# Patient Record
Sex: FEMALE | Race: WHITE | NOT HISPANIC OR LATINO | Employment: OTHER | URBAN - METROPOLITAN AREA
[De-identification: names, ages, dates, MRNs, and addresses within clinical notes are randomized per-mention and may not be internally consistent; named-entity substitution may affect disease eponyms.]

---

## 2023-05-18 ENCOUNTER — LAB (OUTPATIENT)
Dept: LAB | Facility: LAB | Age: 78
End: 2023-05-18
Payer: MEDICARE

## 2023-05-18 ENCOUNTER — OFFICE VISIT (OUTPATIENT)
Dept: PRIMARY CARE | Facility: CLINIC | Age: 78
End: 2023-05-18
Payer: MEDICARE

## 2023-05-18 VITALS
HEIGHT: 59 IN | SYSTOLIC BLOOD PRESSURE: 130 MMHG | DIASTOLIC BLOOD PRESSURE: 84 MMHG | WEIGHT: 141 LBS | BODY MASS INDEX: 28.43 KG/M2

## 2023-05-18 DIAGNOSIS — M79.602 PAIN OF LEFT UPPER EXTREMITY: Primary | ICD-10-CM

## 2023-05-18 DIAGNOSIS — M85.88 OSTEOPENIA OF OTHER SITE: ICD-10-CM

## 2023-05-18 DIAGNOSIS — Z00.00 HEALTH CARE MAINTENANCE: ICD-10-CM

## 2023-05-18 DIAGNOSIS — D64.9 ANEMIA, UNSPECIFIED TYPE: ICD-10-CM

## 2023-05-18 DIAGNOSIS — E03.9 HYPOTHYROIDISM, UNSPECIFIED TYPE: ICD-10-CM

## 2023-05-18 DIAGNOSIS — M85.829 OSTEOPENIA OF UPPER ARM, UNSPECIFIED LATERALITY: ICD-10-CM

## 2023-05-18 DIAGNOSIS — E78.5 HYPERLIPIDEMIA, UNSPECIFIED HYPERLIPIDEMIA TYPE: ICD-10-CM

## 2023-05-18 PROBLEM — I45.2 BIFASCICULAR BLOCK: Status: ACTIVE | Noted: 2023-05-18

## 2023-05-18 PROBLEM — R94.31 ABNORMAL EKG: Status: ACTIVE | Noted: 2023-05-18

## 2023-05-18 PROBLEM — R00.0 TACHYCARDIA: Status: ACTIVE | Noted: 2023-05-18

## 2023-05-18 PROBLEM — R07.89 CHEST PAIN, ATYPICAL: Status: ACTIVE | Noted: 2023-05-18

## 2023-05-18 PROBLEM — M54.2 NECK PAIN: Status: ACTIVE | Noted: 2023-05-18

## 2023-05-18 PROBLEM — R05.3 PERSISTENT COUGH: Status: ACTIVE | Noted: 2023-05-18

## 2023-05-18 PROBLEM — I77.1 SUBCLAVIAN ARTERIAL STENOSIS (CMS-HCC): Status: RESOLVED | Noted: 2023-05-18 | Resolved: 2023-05-18

## 2023-05-18 PROBLEM — Q27.9 VENOUS ANOMALY (HHS-HCC): Status: ACTIVE | Noted: 2023-05-18

## 2023-05-18 PROBLEM — R42 DIZZINESS, NONSPECIFIC: Status: ACTIVE | Noted: 2023-05-18

## 2023-05-18 PROBLEM — I25.10 CAD (CORONARY ARTERY DISEASE): Status: ACTIVE | Noted: 2023-05-18

## 2023-05-18 PROBLEM — I77.1 SUBCLAVIAN ARTERIAL STENOSIS (CMS-HCC): Status: ACTIVE | Noted: 2023-05-18

## 2023-05-18 PROBLEM — N30.00 ACUTE CYSTITIS WITHOUT HEMATURIA: Status: ACTIVE | Noted: 2023-05-18

## 2023-05-18 PROBLEM — R59.9 SWELLING OF LYMPH NODE: Status: ACTIVE | Noted: 2023-05-18

## 2023-05-18 PROBLEM — R05.9 COUGH: Status: ACTIVE | Noted: 2023-05-18

## 2023-05-18 PROBLEM — G25.81 RESTLESS LEG SYNDROME: Status: ACTIVE | Noted: 2023-05-18

## 2023-05-18 PROBLEM — R39.9 UTI SYMPTOMS: Status: ACTIVE | Noted: 2023-05-18

## 2023-05-18 PROBLEM — M62.838 MUSCLE SPASMS OF NECK: Status: ACTIVE | Noted: 2023-05-18

## 2023-05-18 PROBLEM — R53.83 FATIGUE: Status: ACTIVE | Noted: 2023-05-18

## 2023-05-18 PROBLEM — I10 HYPERTENSION: Status: ACTIVE | Noted: 2023-05-18

## 2023-05-18 PROBLEM — E78.00 HYPERCHOLESTEREMIA: Status: ACTIVE | Noted: 2023-05-18

## 2023-05-18 PROBLEM — R00.2 PALPITATIONS: Status: ACTIVE | Noted: 2023-05-18

## 2023-05-18 PROBLEM — J01.90 ACUTE SINUSITIS: Status: ACTIVE | Noted: 2023-05-18

## 2023-05-18 PROBLEM — J30.9 ALLERGIC RHINITIS: Status: ACTIVE | Noted: 2023-05-18

## 2023-05-18 PROBLEM — K76.89 LIVER CYST: Status: ACTIVE | Noted: 2023-05-18

## 2023-05-18 PROBLEM — H93.13 TINNITUS OF BOTH EARS: Status: ACTIVE | Noted: 2023-05-18

## 2023-05-18 LAB
ALANINE AMINOTRANSFERASE (SGPT) (U/L) IN SER/PLAS: 21 U/L (ref 7–45)
ALBUMIN (G/DL) IN SER/PLAS: 4.6 G/DL (ref 3.4–5)
ALKALINE PHOSPHATASE (U/L) IN SER/PLAS: 76 U/L (ref 33–136)
ANION GAP IN SER/PLAS: 12 MMOL/L (ref 10–20)
ASPARTATE AMINOTRANSFERASE (SGOT) (U/L) IN SER/PLAS: 24 U/L (ref 9–39)
BILIRUBIN TOTAL (MG/DL) IN SER/PLAS: 0.4 MG/DL (ref 0–1.2)
CALCIUM (MG/DL) IN SER/PLAS: 10.2 MG/DL (ref 8.6–10.6)
CARBON DIOXIDE, TOTAL (MMOL/L) IN SER/PLAS: 31 MMOL/L (ref 21–32)
CHLORIDE (MMOL/L) IN SER/PLAS: 103 MMOL/L (ref 98–107)
CHOLESTEROL (MG/DL) IN SER/PLAS: 269 MG/DL (ref 0–199)
CHOLESTEROL IN HDL (MG/DL) IN SER/PLAS: 64.5 MG/DL
CHOLESTEROL/HDL RATIO: 4.2
CREATININE (MG/DL) IN SER/PLAS: 0.94 MG/DL (ref 0.5–1.05)
ERYTHROCYTE DISTRIBUTION WIDTH (RATIO) BY AUTOMATED COUNT: 13.3 % (ref 11.5–14.5)
ERYTHROCYTE MEAN CORPUSCULAR HEMOGLOBIN CONCENTRATION (G/DL) BY AUTOMATED: 31.5 G/DL (ref 32–36)
ERYTHROCYTE MEAN CORPUSCULAR VOLUME (FL) BY AUTOMATED COUNT: 100 FL (ref 80–100)
ERYTHROCYTES (10*6/UL) IN BLOOD BY AUTOMATED COUNT: 4.27 X10E12/L (ref 4–5.2)
ESTIMATED AVERAGE GLUCOSE FOR HBA1C: 120 MG/DL
GFR FEMALE: 63 ML/MIN/1.73M2
GLUCOSE (MG/DL) IN SER/PLAS: 98 MG/DL (ref 74–99)
HEMATOCRIT (%) IN BLOOD BY AUTOMATED COUNT: 42.5 % (ref 36–46)
HEMOGLOBIN (G/DL) IN BLOOD: 13.4 G/DL (ref 12–16)
HEMOGLOBIN A1C/HEMOGLOBIN TOTAL IN BLOOD: 5.8 %
LDL: 136 MG/DL (ref 0–99)
LEUKOCYTES (10*3/UL) IN BLOOD BY AUTOMATED COUNT: 7.5 X10E9/L (ref 4.4–11.3)
NON HDL CHOLESTEROL: 205 MG/DL
NRBC (PER 100 WBCS) BY AUTOMATED COUNT: 0 /100 WBC (ref 0–0)
PLATELETS (10*3/UL) IN BLOOD AUTOMATED COUNT: 273 X10E9/L (ref 150–450)
POTASSIUM (MMOL/L) IN SER/PLAS: 4.3 MMOL/L (ref 3.5–5.3)
PROTEIN TOTAL: 7 G/DL (ref 6.4–8.2)
SODIUM (MMOL/L) IN SER/PLAS: 142 MMOL/L (ref 136–145)
THYROTROPIN (MIU/L) IN SER/PLAS BY DETECTION LIMIT <= 0.05 MIU/L: 3.49 MIU/L (ref 0.44–3.98)
TRIGLYCERIDE (MG/DL) IN SER/PLAS: 344 MG/DL (ref 0–149)
UREA NITROGEN (MG/DL) IN SER/PLAS: 22 MG/DL (ref 6–23)
VLDL: 69 MG/DL (ref 0–40)

## 2023-05-18 PROCEDURE — 99204 OFFICE O/P NEW MOD 45 MIN: CPT | Performed by: STUDENT IN AN ORGANIZED HEALTH CARE EDUCATION/TRAINING PROGRAM

## 2023-05-18 PROCEDURE — 83036 HEMOGLOBIN GLYCOSYLATED A1C: CPT

## 2023-05-18 PROCEDURE — 36415 COLL VENOUS BLD VENIPUNCTURE: CPT

## 2023-05-18 PROCEDURE — 80061 LIPID PANEL: CPT

## 2023-05-18 PROCEDURE — 84443 ASSAY THYROID STIM HORMONE: CPT

## 2023-05-18 PROCEDURE — 85027 COMPLETE CBC AUTOMATED: CPT

## 2023-05-18 PROCEDURE — 1159F MED LIST DOCD IN RCRD: CPT | Performed by: STUDENT IN AN ORGANIZED HEALTH CARE EDUCATION/TRAINING PROGRAM

## 2023-05-18 PROCEDURE — 3075F SYST BP GE 130 - 139MM HG: CPT | Performed by: STUDENT IN AN ORGANIZED HEALTH CARE EDUCATION/TRAINING PROGRAM

## 2023-05-18 PROCEDURE — 1160F RVW MEDS BY RX/DR IN RCRD: CPT | Performed by: STUDENT IN AN ORGANIZED HEALTH CARE EDUCATION/TRAINING PROGRAM

## 2023-05-18 PROCEDURE — 3079F DIAST BP 80-89 MM HG: CPT | Performed by: STUDENT IN AN ORGANIZED HEALTH CARE EDUCATION/TRAINING PROGRAM

## 2023-05-18 PROCEDURE — 1036F TOBACCO NON-USER: CPT | Performed by: STUDENT IN AN ORGANIZED HEALTH CARE EDUCATION/TRAINING PROGRAM

## 2023-05-18 PROCEDURE — 80053 COMPREHEN METABOLIC PANEL: CPT

## 2023-05-18 RX ORDER — ROSUVASTATIN CALCIUM 5 MG/1
5 TABLET, COATED ORAL DAILY
COMMUNITY
Start: 2023-04-16 | End: 2023-05-18 | Stop reason: ALTCHOICE

## 2023-05-18 RX ORDER — LEVOTHYROXINE SODIUM 75 UG/1
75 TABLET ORAL DAILY
COMMUNITY
Start: 2023-04-07 | End: 2024-03-06 | Stop reason: SDUPTHER

## 2023-05-18 RX ORDER — LEVOTHYROXINE SODIUM 88 UG/1
1 TABLET ORAL DAILY
COMMUNITY
End: 2023-05-18 | Stop reason: ALTCHOICE

## 2023-05-18 RX ORDER — CALCIUM CARBONATE 300MG(750)
400 TABLET,CHEWABLE ORAL DAILY
COMMUNITY

## 2023-05-18 RX ORDER — ROSUVASTATIN CALCIUM 20 MG/1
1 TABLET, COATED ORAL DAILY
COMMUNITY
Start: 2019-05-10 | End: 2023-05-18 | Stop reason: ALTCHOICE

## 2023-05-18 RX ORDER — ATENOLOL 25 MG/1
25 TABLET ORAL DAILY
COMMUNITY
Start: 2023-01-14 | End: 2023-07-07 | Stop reason: SDUPTHER

## 2023-05-18 ASSESSMENT — ENCOUNTER SYMPTOMS
LOSS OF SENSATION IN FEET: 0
DEPRESSION: 0
ARTHRALGIAS: 1
OCCASIONAL FEELINGS OF UNSTEADINESS: 1
GASTROINTESTINAL NEGATIVE: 1
CONSTITUTIONAL NEGATIVE: 1
PSYCHIATRIC NEGATIVE: 1
CARDIOVASCULAR NEGATIVE: 1
MYALGIAS: 1
NEUROLOGICAL NEGATIVE: 1
RESPIRATORY NEGATIVE: 1

## 2023-05-18 NOTE — PROGRESS NOTES
Establish as a new patient  Pain in the left shoulder    Subjective   Patient ID: María Clifford is a 77 y.o. female.    Pt is a 77 year old female with HTN, hypothyroidism, osteopenia, HLD who presents for establishment of care. Pt overall feels well and is tolerating her medications. Up to date on most preventative care however does need a dexa scan. She does have a few concerns as she reports some issues with her L Arm. Finds that she has pain and feels as if there is a lump in the arm. Has good range of motion however gets pain every now and then. No injuries noted since th pain stated. She was able to also bring her past medical records in and these were reviewed. She is concerned about her fatty liver and did take herself off her cholesterol medication for this as she was concerned for further liver disease. Regards no additional issues at this time    PMHX as above  Social Denies any drug tobacco or alcohol use  Fhx noncontributory  Surgical denies    Arm Pain         Review of Systems   Constitutional: Negative.    HENT: Negative.     Respiratory: Negative.     Cardiovascular: Negative.    Gastrointestinal: Negative.    Musculoskeletal:  Positive for arthralgias and myalgias.        Shoulder pain   Neurological: Negative.    Psychiatric/Behavioral: Negative.         Objective Vitals Reviewed via facility EMR     Physical Exam  Constitutional:       General: She is not in acute distress.     Appearance: She is not ill-appearing.   HENT:      Mouth/Throat:      Mouth: Mucous membranes are moist.      Pharynx: Oropharynx is clear. No oropharyngeal exudate or posterior oropharyngeal erythema.   Eyes:      Pupils: Pupils are equal, round, and reactive to light.   Cardiovascular:      Rate and Rhythm: Normal rate and regular rhythm.      Heart sounds: No murmur heard.  Pulmonary:      Effort: Pulmonary effort is normal. No respiratory distress.      Breath sounds: No wheezing.   Abdominal:      General: Abdomen is  flat. Bowel sounds are normal. There is no distension.      Palpations: Abdomen is soft.      Tenderness: There is no abdominal tenderness.   Musculoskeletal:         General: Tenderness and deformity present. Normal range of motion.      Comments: Pain with internal rotation and dec rom   Skin:     General: Skin is warm and dry.   Neurological:      General: No focal deficit present.      Mental Status: She is alert and oriented to person, place, and time. Mental status is at baseline.   Psychiatric:         Mood and Affect: Mood normal.         Assessment/Plan   Diagnoses and all orders for this visit:  Pain of left upper extremity  -     XR humerus left; Future  -     XR shoulder left 2+ views; Future  Health care maintenance  -     CBC; Future  -     Comprehensive Metabolic Panel; Future  -     Hemoglobin A1C; Future  -     Lipid panel; Future  Hyperlipidemia, unspecified hyperlipidemia type  -     CT cardiac scoring wo IV contrast; Future  Hypothyroidism, unspecified type  -     TSH with reflex to Free T4 if abnormal; Future  Anemia, unspecified type  -     CBC; Future  Osteopenia of upper arm, unspecified laterality  -     XR DEXA bone density; Future  Osteopenia of other site  -     XR DEXA bone density; Future    Pt seen on establishment of care    #HTN  Stable well controlled, follows with cardiology    #HLD  High cholesterol noted, on labs, did take herself off of statis, recommend CT cardiac scoring for risk statrification and can further discuss     #fatty liver  Advise diet and exercise, has been avoiding statins secondary to this, check Cmp, lipid and CT cardiac scoring as above    #hypothyroid  Check Tsh    #arm pain, humeral pain  Noted on exam, recommend xrays, could consider PT/OT referral hjowever defers, consider ortho consult    #osteopenia  Dexa scan    #health maintaince  Previous records reviewed  Labs at this visit  UTD on age appropriate prevention  Depression screen neg  Medicare wellness  at next visit    RTC in 6-8 month

## 2023-07-07 DIAGNOSIS — I10 PRIMARY HYPERTENSION: Primary | ICD-10-CM

## 2023-07-07 RX ORDER — ATENOLOL 25 MG/1
25 TABLET ORAL DAILY
Qty: 90 TABLET | Refills: 0 | Status: SHIPPED | OUTPATIENT
Start: 2023-07-07 | End: 2023-10-03

## 2023-09-07 ENCOUNTER — TELEPHONE (OUTPATIENT)
Dept: PRIMARY CARE | Facility: CLINIC | Age: 78
End: 2023-09-07
Payer: MEDICARE

## 2023-09-07 DIAGNOSIS — E78.5 HYPERLIPIDEMIA, UNSPECIFIED HYPERLIPIDEMIA TYPE: Primary | ICD-10-CM

## 2023-09-07 RX ORDER — ATORVASTATIN CALCIUM 20 MG/1
20 TABLET, FILM COATED ORAL DAILY
Qty: 90 TABLET | Refills: 0 | Status: SHIPPED | OUTPATIENT
Start: 2023-09-07 | End: 2024-01-17 | Stop reason: WASHOUT

## 2023-09-07 NOTE — TELEPHONE ENCOUNTER
Informed pt cardiac scoring elevated and lipitor rx'd.  She said she has fatty liver and in the past it was advised not to take cholesterol lowering meds.  Please call.

## 2023-09-12 RX ORDER — EZETIMIBE 10 MG/1
10 TABLET ORAL DAILY
Qty: 90 TABLET | Refills: 1 | Status: SHIPPED | OUTPATIENT
Start: 2023-09-12 | End: 2024-03-10

## 2024-01-17 ENCOUNTER — OFFICE VISIT (OUTPATIENT)
Dept: URGENT CARE | Facility: CLINIC | Age: 79
End: 2024-01-17
Payer: MEDICARE

## 2024-01-17 VITALS
TEMPERATURE: 96.1 F | RESPIRATION RATE: 14 BRPM | HEIGHT: 59 IN | HEART RATE: 110 BPM | BODY MASS INDEX: 27.21 KG/M2 | WEIGHT: 135 LBS | OXYGEN SATURATION: 94 % | SYSTOLIC BLOOD PRESSURE: 128 MMHG | DIASTOLIC BLOOD PRESSURE: 82 MMHG

## 2024-01-17 DIAGNOSIS — J01.90 ACUTE SINUSITIS, RECURRENCE NOT SPECIFIED, UNSPECIFIED LOCATION: Primary | ICD-10-CM

## 2024-01-17 PROCEDURE — 1159F MED LIST DOCD IN RCRD: CPT | Performed by: PHYSICIAN ASSISTANT

## 2024-01-17 PROCEDURE — 99203 OFFICE O/P NEW LOW 30 MIN: CPT | Performed by: PHYSICIAN ASSISTANT

## 2024-01-17 PROCEDURE — 1160F RVW MEDS BY RX/DR IN RCRD: CPT | Performed by: PHYSICIAN ASSISTANT

## 2024-01-17 PROCEDURE — 1125F AMNT PAIN NOTED PAIN PRSNT: CPT | Performed by: PHYSICIAN ASSISTANT

## 2024-01-17 PROCEDURE — 1036F TOBACCO NON-USER: CPT | Performed by: PHYSICIAN ASSISTANT

## 2024-01-17 PROCEDURE — 3074F SYST BP LT 130 MM HG: CPT | Performed by: PHYSICIAN ASSISTANT

## 2024-01-17 PROCEDURE — 3079F DIAST BP 80-89 MM HG: CPT | Performed by: PHYSICIAN ASSISTANT

## 2024-01-17 RX ORDER — DOXYCYCLINE 100 MG/1
100 CAPSULE ORAL 2 TIMES DAILY
Qty: 20 CAPSULE | Refills: 0 | Status: SHIPPED | OUTPATIENT
Start: 2024-01-17 | End: 2024-01-27

## 2024-01-17 RX ORDER — PSEUDOEPHEDRINE HCL 120 MG
1 TABLET, EXTENDED RELEASE ORAL DAILY
COMMUNITY

## 2024-01-17 RX ORDER — BENZONATATE 100 MG/1
100 CAPSULE ORAL 3 TIMES DAILY PRN
Qty: 30 CAPSULE | Refills: 0 | Status: SHIPPED | OUTPATIENT
Start: 2024-01-17 | End: 2025-01-16

## 2024-01-17 ASSESSMENT — ENCOUNTER SYMPTOMS
SINUS COMPLAINT: 1
COUGH: 1
FATIGUE: 1
SINUS PAIN: 1
SINUS PRESSURE: 1

## 2024-01-17 ASSESSMENT — PAIN SCALES - GENERAL: PAINLEVEL: 6

## 2024-01-17 NOTE — PROGRESS NOTES
Subjective   Patient ID: María Clifford is a 78 y.o. female.    Patient is a 78-year-old female who complains of ongoing congestion, sinus pressure, facial pain and cough that she has been experiencing for approximately the past 4 weeks.  Patient denies fever, chills or myalgia.  Patient has no history of asthma or COPD and does not smoke.      Cough    Sinus Problem  Associated symptoms: congestion, cough and fatigue    Fatigue  Associated symptoms: congestion, cough and fatigue    The following portions of the chart were reviewed this encounter and updated as appropriate:   Review of Systems   Constitutional:  Positive for fatigue.   HENT:  Positive for congestion, sinus pressure and sinus pain.    Respiratory:  Positive for cough.    All other systems reviewed and are negative.  Objective   Physical Exam  Vitals and nursing note reviewed.   Constitutional:       Appearance: Normal appearance. She is normal weight.   HENT:      Head: Normocephalic and atraumatic.      Right Ear: Tympanic membrane, ear canal and external ear normal.      Left Ear: Tympanic membrane, ear canal and external ear normal.      Nose: Nose normal. No congestion or rhinorrhea.      Mouth/Throat:      Mouth: Mucous membranes are moist.      Pharynx: Oropharynx is clear. No oropharyngeal exudate or posterior oropharyngeal erythema.   Eyes:      Extraocular Movements: Extraocular movements intact.      Conjunctiva/sclera: Conjunctivae normal.      Pupils: Pupils are equal, round, and reactive to light.   Cardiovascular:      Rate and Rhythm: Normal rate and regular rhythm.      Pulses: Normal pulses.      Heart sounds: Normal heart sounds.   Pulmonary:      Effort: Pulmonary effort is normal. No respiratory distress.      Breath sounds: Normal breath sounds. No stridor. No wheezing, rhonchi or rales.   Musculoskeletal:      Cervical back: Normal range of motion and neck supple.   Skin:     General: Skin is warm and dry.      Capillary Refill:  Capillary refill takes less than 2 seconds.   Neurological:      General: No focal deficit present.      Mental Status: She is alert and oriented to person, place, and time.   Psychiatric:         Mood and Affect: Mood normal.         Behavior: Behavior normal.         Thought Content: Thought content normal.         Judgment: Judgment normal.     Assessment/Plan   Physical exam findings as noted above.  Patient was provided with prescriptions for doxycycline 100 mg and Tessalon 100 mg.  Supportive care instructions were discussed and the patient verbalizes clear understanding of same.    CLINICAL IMPRESSION:  Acute Sinusitis    Diagnoses and all orders for this visit:  Acute sinusitis, recurrence not specified, unspecified location  -     doxycycline (Monodox) 100 mg capsule; Take 1 capsule (100 mg) by mouth 2 times a day for 10 days. Take with at least 8 ounces (large glass) of water, do not lie down for 30 minutes after  -     benzonatate (Tessalon Perles) 100 mg capsule; Take 1 capsule (100 mg) by mouth 3 times a day as needed for cough.

## 2024-03-06 DIAGNOSIS — E03.9 HYPOTHYROIDISM, UNSPECIFIED TYPE: Primary | ICD-10-CM

## 2024-03-06 RX ORDER — LEVOTHYROXINE SODIUM 75 UG/1
75 TABLET ORAL DAILY
Qty: 30 TABLET | Refills: 0 | Status: SHIPPED | OUTPATIENT
Start: 2024-03-06 | End: 2024-03-22 | Stop reason: SDUPTHER

## 2024-03-21 ENCOUNTER — OFFICE VISIT (OUTPATIENT)
Dept: OBSTETRICS AND GYNECOLOGY | Facility: CLINIC | Age: 79
End: 2024-03-21
Payer: MEDICARE

## 2024-03-21 DIAGNOSIS — Z12.31 ENCOUNTER FOR SCREENING MAMMOGRAM FOR MALIGNANT NEOPLASM OF BREAST: ICD-10-CM

## 2024-03-21 DIAGNOSIS — Z12.83 SKIN EXAM FOR MALIGNANT NEOPLASM: Primary | ICD-10-CM

## 2024-03-21 DIAGNOSIS — I10 PRIMARY HYPERTENSION: ICD-10-CM

## 2024-03-21 DIAGNOSIS — L29.3 GENITAL PRURITUS: ICD-10-CM

## 2024-03-21 PROCEDURE — 1157F ADVNC CARE PLAN IN RCRD: CPT | Performed by: ADVANCED PRACTICE MIDWIFE

## 2024-03-21 PROCEDURE — 99203 OFFICE O/P NEW LOW 30 MIN: CPT | Performed by: ADVANCED PRACTICE MIDWIFE

## 2024-03-21 PROCEDURE — 1036F TOBACCO NON-USER: CPT | Performed by: ADVANCED PRACTICE MIDWIFE

## 2024-03-21 PROCEDURE — 1159F MED LIST DOCD IN RCRD: CPT | Performed by: ADVANCED PRACTICE MIDWIFE

## 2024-03-21 RX ORDER — ALUMINUM ZIRCONIUM OCTACHLOROHYDREX GLY 16 G/100G
GEL TOPICAL
COMMUNITY
Start: 2022-12-14

## 2024-03-21 RX ORDER — NYSTATIN 100000 [USP'U]/G
1 POWDER TOPICAL 3 TIMES DAILY
Qty: 15 G | Refills: 0 | Status: SHIPPED | OUTPATIENT
Start: 2024-03-21 | End: 2025-03-21

## 2024-03-21 ASSESSMENT — ENCOUNTER SYMPTOMS
HEMATOLOGIC/LYMPHATIC NEGATIVE: 1
EYES NEGATIVE: 1
NEUROLOGICAL NEGATIVE: 1
PSYCHIATRIC NEGATIVE: 1
CONSTITUTIONAL NEGATIVE: 1
ENDOCRINE NEGATIVE: 1
MUSCULOSKELETAL NEGATIVE: 1
GASTROINTESTINAL NEGATIVE: 1
CARDIOVASCULAR NEGATIVE: 1
ALLERGIC/IMMUNOLOGIC NEGATIVE: 1
RESPIRATORY NEGATIVE: 1

## 2024-03-22 ENCOUNTER — OFFICE VISIT (OUTPATIENT)
Dept: PRIMARY CARE | Facility: CLINIC | Age: 79
End: 2024-03-22
Payer: MEDICARE

## 2024-03-22 ENCOUNTER — HOSPITAL ENCOUNTER (OUTPATIENT)
Dept: RADIOLOGY | Facility: CLINIC | Age: 79
Discharge: HOME | End: 2024-03-22
Payer: MEDICARE

## 2024-03-22 VITALS
SYSTOLIC BLOOD PRESSURE: 122 MMHG | WEIGHT: 143 LBS | BODY MASS INDEX: 28.83 KG/M2 | DIASTOLIC BLOOD PRESSURE: 70 MMHG | HEIGHT: 59 IN

## 2024-03-22 DIAGNOSIS — M25.512 CHRONIC LEFT SHOULDER PAIN: ICD-10-CM

## 2024-03-22 DIAGNOSIS — G89.29 CHRONIC LEFT SHOULDER PAIN: ICD-10-CM

## 2024-03-22 DIAGNOSIS — E03.9 HYPOTHYROIDISM, UNSPECIFIED TYPE: ICD-10-CM

## 2024-03-22 DIAGNOSIS — E78.5 HYPERLIPIDEMIA, UNSPECIFIED HYPERLIPIDEMIA TYPE: ICD-10-CM

## 2024-03-22 DIAGNOSIS — I25.10 CORONARY ARTERY DISEASE DUE TO LIPID RICH PLAQUE: Primary | ICD-10-CM

## 2024-03-22 DIAGNOSIS — I25.83 CORONARY ARTERY DISEASE DUE TO LIPID RICH PLAQUE: Primary | ICD-10-CM

## 2024-03-22 PROCEDURE — 1036F TOBACCO NON-USER: CPT | Performed by: STUDENT IN AN ORGANIZED HEALTH CARE EDUCATION/TRAINING PROGRAM

## 2024-03-22 PROCEDURE — 73030 X-RAY EXAM OF SHOULDER: CPT | Mod: LEFT SIDE | Performed by: RADIOLOGY

## 2024-03-22 PROCEDURE — 3074F SYST BP LT 130 MM HG: CPT | Performed by: STUDENT IN AN ORGANIZED HEALTH CARE EDUCATION/TRAINING PROGRAM

## 2024-03-22 PROCEDURE — 3078F DIAST BP <80 MM HG: CPT | Performed by: STUDENT IN AN ORGANIZED HEALTH CARE EDUCATION/TRAINING PROGRAM

## 2024-03-22 PROCEDURE — 73030 X-RAY EXAM OF SHOULDER: CPT | Mod: LT

## 2024-03-22 PROCEDURE — 1159F MED LIST DOCD IN RCRD: CPT | Performed by: STUDENT IN AN ORGANIZED HEALTH CARE EDUCATION/TRAINING PROGRAM

## 2024-03-22 PROCEDURE — 1160F RVW MEDS BY RX/DR IN RCRD: CPT | Performed by: STUDENT IN AN ORGANIZED HEALTH CARE EDUCATION/TRAINING PROGRAM

## 2024-03-22 PROCEDURE — 1157F ADVNC CARE PLAN IN RCRD: CPT | Performed by: STUDENT IN AN ORGANIZED HEALTH CARE EDUCATION/TRAINING PROGRAM

## 2024-03-22 PROCEDURE — 99214 OFFICE O/P EST MOD 30 MIN: CPT | Performed by: STUDENT IN AN ORGANIZED HEALTH CARE EDUCATION/TRAINING PROGRAM

## 2024-03-22 PROCEDURE — G2211 COMPLEX E/M VISIT ADD ON: HCPCS | Performed by: STUDENT IN AN ORGANIZED HEALTH CARE EDUCATION/TRAINING PROGRAM

## 2024-03-22 RX ORDER — LEVOTHYROXINE SODIUM 75 UG/1
75 TABLET ORAL DAILY
Qty: 90 TABLET | Refills: 0 | Status: SHIPPED | OUTPATIENT
Start: 2024-03-22

## 2024-03-22 RX ORDER — NAPROXEN SODIUM 220 MG/1
81 TABLET, FILM COATED ORAL DAILY
Qty: 90 TABLET | Refills: 1 | Status: SHIPPED | OUTPATIENT
Start: 2024-03-22 | End: 2024-09-18

## 2024-03-22 ASSESSMENT — ENCOUNTER SYMPTOMS
PSYCHIATRIC NEGATIVE: 1
RESPIRATORY NEGATIVE: 1
CARDIOVASCULAR NEGATIVE: 1
JOINT SWELLING: 1
GASTROINTESTINAL NEGATIVE: 1
MYALGIAS: 1
ARTHRALGIAS: 1
NEUROLOGICAL NEGATIVE: 1
CONSTITUTIONAL NEGATIVE: 1

## 2024-03-22 NOTE — PROGRESS NOTES
"Subjective   Patient ID: María Clifford is a 78 y.o. female who presents for Follow-up (Med refill).    Patient seen on follow-up.  She reports that she does have a couple of issues to discuss.  Is concerned about her underlying cardiovascular risk score.  She did get some screening test that showed that she is a significant elevation.  She is unable to tolerate a statin, did have an elevated cardiac calcium score.  Is wondering about management with regards to this.  In addition, she reports that she is having some issues with her left shoulder.  Does have decreased range of motion especially with overhead activities.  It is somewhat painful at times, however mostly tolerable.  Is concerned about her eyedrops from her doctor.  Is wondering about alternative treatments as well.  Otherwise, states no additional issues or concerns.,         Review of Systems   Constitutional: Negative.    HENT: Negative.     Respiratory: Negative.     Cardiovascular: Negative.    Gastrointestinal: Negative.    Musculoskeletal:  Positive for arthralgias, joint swelling and myalgias.   Neurological: Negative.    Psychiatric/Behavioral: Negative.         Objective   Ht 1.499 m (4' 11\")   Wt 64.9 kg (143 lb)   BMI 28.88 kg/m²     Physical Exam  Constitutional:       General: She is not in acute distress.     Appearance: She is not ill-appearing.   Eyes:      Pupils: Pupils are equal, round, and reactive to light.   Cardiovascular:      Rate and Rhythm: Normal rate and regular rhythm.      Pulses: Normal pulses.      Heart sounds: No murmur heard.  Pulmonary:      Effort: No respiratory distress.      Breath sounds: No wheezing.   Abdominal:      General: Abdomen is flat. Bowel sounds are normal. There is no distension.   Musculoskeletal:      Right lower leg: No edema.      Left lower leg: No edema.      Comments: Decreased range of motion of left shoulder.  Especially with internal rotation   Skin:     General: Skin is warm and dry. "   Neurological:      Mental Status: She is alert. Mental status is at baseline.      Cranial Nerves: No cranial nerve deficit.      Motor: No weakness.   Psychiatric:         Mood and Affect: Mood normal.         Behavior: Behavior normal.         Assessment/Plan   Problem List Items Addressed This Visit             ICD-10-CM    CAD (coronary artery disease) - Primary I25.10    Relevant Medications    aspirin 81 mg chewable tablet    Other Relevant Orders    Nuclear Stress Test    Hypothyroidism E03.9    Relevant Medications    Synthroid 75 mcg tablet    Other Relevant Orders    Nuclear Stress Test     Other Visit Diagnoses         Codes    Hyperlipidemia, unspecified hyperlipidemia type     E78.5    Chronic left shoulder pain     M25.512, G89.29    Relevant Orders    Referral to Orthopaedic Surgery    XR shoulder left 2+ views                Pt seen on establishment of care     #HTN  Stable well controlled, follows with cardiology    #HLD  She is unable to tolerate statins is on Zetia however cholesterol levels still are elevated, potentially could be a candidate of Pcks9 inhibitor.  She will discuss this with her cardiologist.  Does have elevated risk factors, recommend stress test     #fatty liver  Continue diet and exercise, as avoidance of statins secondary to this advised to discuss with cardiology    #hypothyroid  Check Tsh     #arm pain, humeral pain  Significantly worsening, recommend x-ray today orthopedic follow-up     #osteopenia  Dexa scan vitamin D supplements needs DEXA scan next year 2025    #Glaucoma  Would prefer alternative eyedrops, advised to discuss with ophthalmology could consider tacrolimus     #health maintaince  Previous records reviewed  Labs at this visit  UTD on age appropriate prevention  Depression screen neg  Medicare wellness at next visit     RTC in 6-8 month

## 2024-03-29 ENCOUNTER — OFFICE VISIT (OUTPATIENT)
Dept: ORTHOPEDIC SURGERY | Facility: CLINIC | Age: 79
End: 2024-03-29
Payer: MEDICARE

## 2024-03-29 VITALS — HEIGHT: 59 IN | WEIGHT: 144 LBS | BODY MASS INDEX: 29.03 KG/M2

## 2024-03-29 DIAGNOSIS — M25.512 CHRONIC LEFT SHOULDER PAIN: Primary | ICD-10-CM

## 2024-03-29 DIAGNOSIS — G89.29 CHRONIC LEFT SHOULDER PAIN: Primary | ICD-10-CM

## 2024-03-29 DIAGNOSIS — M75.82 TENDINITIS OF LEFT ROTATOR CUFF: ICD-10-CM

## 2024-03-29 PROCEDURE — 1157F ADVNC CARE PLAN IN RCRD: CPT | Performed by: ORTHOPAEDIC SURGERY

## 2024-03-29 PROCEDURE — 99204 OFFICE O/P NEW MOD 45 MIN: CPT | Performed by: ORTHOPAEDIC SURGERY

## 2024-03-29 PROCEDURE — 1160F RVW MEDS BY RX/DR IN RCRD: CPT | Performed by: ORTHOPAEDIC SURGERY

## 2024-03-29 PROCEDURE — 1036F TOBACCO NON-USER: CPT | Performed by: ORTHOPAEDIC SURGERY

## 2024-03-29 PROCEDURE — 20610 DRAIN/INJ JOINT/BURSA W/O US: CPT | Performed by: ORTHOPAEDIC SURGERY

## 2024-03-29 PROCEDURE — 1159F MED LIST DOCD IN RCRD: CPT | Performed by: ORTHOPAEDIC SURGERY

## 2024-03-29 RX ORDER — METHYLPREDNISOLONE ACETATE 40 MG/ML
40 INJECTION, SUSPENSION INTRA-ARTICULAR; INTRALESIONAL; INTRAMUSCULAR; SOFT TISSUE
Status: COMPLETED | OUTPATIENT
Start: 2024-03-29 | End: 2024-03-29

## 2024-03-29 RX ORDER — LIDOCAINE HYDROCHLORIDE 20 MG/ML
2 INJECTION, SOLUTION INFILTRATION; PERINEURAL
Status: COMPLETED | OUTPATIENT
Start: 2024-03-29 | End: 2024-03-29

## 2024-03-29 RX ADMIN — METHYLPREDNISOLONE ACETATE 40 MG: 40 INJECTION, SUSPENSION INTRA-ARTICULAR; INTRALESIONAL; INTRAMUSCULAR; SOFT TISSUE at 09:53

## 2024-03-29 RX ADMIN — LIDOCAINE HYDROCHLORIDE 2 ML: 20 INJECTION, SOLUTION INFILTRATION; PERINEURAL at 09:53

## 2024-03-29 NOTE — PROGRESS NOTES
Chief complaint is left shoulder pain.  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  She has no numbness or tingling it hurts with shoulder motion she is right-hand dominant  No prior shoulder problems  Past medical,family and social histories have been reviewed and are up to date.  All other body systems have been reviewed and are negative for complaint.  Constitutional: Well-developed well-nourished   Eyes: Sclerae anicteric, pupils equal and round  HENT: Normocephalic atraumatic  Cardiovascular: Pulses full, regular rate and rhythm  Respiratory: Breathing not labored, no wheezing  Integumentary: Skin intact, no lesions or rashes  Neurological: Sensation intact, no gross strength deficits, reflexes equal  Psychiatric: Alert oriented and appropriate  Hematologic/lymphatic: No lymphadenopathy  Left shoulder: Mild shoulder atrophy no deformity lacks about 40 degrees of elevation and 3 levels of internal rotation and external rotation is equivalent  He has positive impingement pain she is tender along the tuberosity  X-rays are personally reviewed and are negative  Impression rotator cuff syndrome  Discussed natural history  Injected shoulder today  Physical therapy referral  Consider further imaging if symptoms persist  All questions answered  L Inj/Asp: L subacromial bursa on 3/29/2024 9:53 AM  Indications: pain  Details: 21 G needle, lateral approach  Medications: 40 mg methylPREDNISolone acetate 40 mg/mL; 2 mL lidocaine 20 mg/mL (2 %)

## 2024-03-30 ENCOUNTER — OFFICE VISIT (OUTPATIENT)
Dept: URGENT CARE | Facility: CLINIC | Age: 79
End: 2024-03-30
Payer: MEDICARE

## 2024-03-30 VITALS
HEIGHT: 59 IN | TEMPERATURE: 97.7 F | HEART RATE: 71 BPM | WEIGHT: 135 LBS | OXYGEN SATURATION: 92 % | SYSTOLIC BLOOD PRESSURE: 130 MMHG | BODY MASS INDEX: 27.21 KG/M2 | DIASTOLIC BLOOD PRESSURE: 68 MMHG | RESPIRATION RATE: 14 BRPM

## 2024-03-30 DIAGNOSIS — H10.32 ACUTE CONJUNCTIVITIS OF LEFT EYE, UNSPECIFIED ACUTE CONJUNCTIVITIS TYPE: Primary | ICD-10-CM

## 2024-03-30 PROCEDURE — 1157F ADVNC CARE PLAN IN RCRD: CPT | Performed by: PHYSICIAN ASSISTANT

## 2024-03-30 PROCEDURE — 1126F AMNT PAIN NOTED NONE PRSNT: CPT | Performed by: PHYSICIAN ASSISTANT

## 2024-03-30 PROCEDURE — 1036F TOBACCO NON-USER: CPT | Performed by: PHYSICIAN ASSISTANT

## 2024-03-30 PROCEDURE — 99213 OFFICE O/P EST LOW 20 MIN: CPT | Performed by: PHYSICIAN ASSISTANT

## 2024-03-30 PROCEDURE — 3075F SYST BP GE 130 - 139MM HG: CPT | Performed by: PHYSICIAN ASSISTANT

## 2024-03-30 PROCEDURE — 1159F MED LIST DOCD IN RCRD: CPT | Performed by: PHYSICIAN ASSISTANT

## 2024-03-30 PROCEDURE — 1160F RVW MEDS BY RX/DR IN RCRD: CPT | Performed by: PHYSICIAN ASSISTANT

## 2024-03-30 PROCEDURE — 3078F DIAST BP <80 MM HG: CPT | Performed by: PHYSICIAN ASSISTANT

## 2024-03-30 RX ORDER — AZELASTINE HYDROCHLORIDE 0.5 MG/ML
1 SOLUTION/ DROPS OPHTHALMIC 2 TIMES DAILY
Qty: 6 ML | Refills: 0 | Status: SHIPPED | OUTPATIENT
Start: 2024-03-30 | End: 2025-03-30

## 2024-03-30 RX ORDER — AZELASTINE HYDROCHLORIDE 0.5 MG/ML
1 SOLUTION/ DROPS OPHTHALMIC 2 TIMES DAILY
Qty: 6 ML | Refills: 0 | Status: SHIPPED | OUTPATIENT
Start: 2024-03-30 | End: 2024-03-30 | Stop reason: SDUPTHER

## 2024-03-30 ASSESSMENT — PAIN SCALES - GENERAL: PAINLEVEL: 0-NO PAIN

## 2024-03-30 NOTE — PROGRESS NOTES
"Subjective   Patient ID: María Clifford is a 78 y.o. female who presents for Eye Problem (Left eye redness and bilateral eye watery discharge x5 hours. No pain. +Itching).  Patient denies any visual changes.  She is currently supposed to be taking Restasis for dry eye syndrome.  She states that she has discontinued this over the last several days as she was looking at the disposal instructions which advise she drop it off at a pharmacy if she is going to dispose of it rather than throwing in the trash or down the toilet.  Highly concerned that if this medication requires such strict rules regarding disposal that she does not want to put this in her eyes.    Past Medical History:   Diagnosis Date    Disease of intestine, unspecified     Intestinal disorder    Disease of upper respiratory tract, unspecified     Upper respiratory disease    Personal history of other diseases of the circulatory system     History of cardiac murmur    Personal history of other diseases of the circulatory system     History of heart block    Personal history of other diseases of the digestive system     History of hepatic disease    Personal history of other diseases of the musculoskeletal system and connective tissue     History of arthritis    Personal history of other endocrine, nutritional and metabolic disease     History of hyperlipidemia    Personal history of other endocrine, nutritional and metabolic disease     History of hyperthyroidism    Personal history of other specified conditions     History of headache    Personal history of pneumonia (recurrent)     History of pneumonia    Personal history of transient ischemic attack (TIA), and cerebral infarction without residual deficits     History of transient cerebral ischemia         The remainder of the systems were reviewed and are negative unless noted above      Objective   /68   Pulse 71   Temp 36.5 °C (97.7 °F) (Temporal)   Resp 14   Ht 1.499 m (4' 11\")   Wt 61.2 " kg (135 lb)   SpO2 92%   BMI 27.27 kg/m²   Physical Exam  Constitutional:       General: She is not in acute distress.     Appearance: Normal appearance. She is not ill-appearing, toxic-appearing or diaphoretic.   HENT:      Head: Normocephalic and atraumatic.      Mouth/Throat:      Mouth: Mucous membranes are moist.      Pharynx: Oropharynx is clear.   Eyes:      General: No scleral icterus.     Extraocular Movements: Extraocular movements intact.      Pupils: Pupils are equal, round, and reactive to light.      Comments: Mild conjunctival injection of the left medial line without any purulent discharge.  Some clear watery discharge present   Cardiovascular:      Rate and Rhythm: Normal rate and regular rhythm.      Heart sounds: No murmur heard.  Pulmonary:      Effort: Pulmonary effort is normal. No respiratory distress.      Breath sounds: Normal breath sounds. No wheezing.   Musculoskeletal:         General: No swelling, tenderness, deformity or signs of injury. Normal range of motion.      Cervical back: Normal range of motion and neck supple.   Skin:     General: Skin is warm and dry.      Findings: No erythema or rash.   Neurological:      General: No focal deficit present.      Mental Status: She is alert and oriented to person, place, and time.      Gait: Gait normal.         Assessment/Plan   Problem List Items Addressed This Visit       Acute conjunctivitis of left eye - Primary   I am recommending follow-up with ophthalmology given patient has discontinued the Restasis drops.  I am sending azelastine drops to the pharmacy to help with the inflammation of the conjunctiva but at this time no findings that raise suspicion for bacterial conjunctivitis.  Perhaps her dry eye syndrome is playing a role in her symptoms especially as it is currently untreated

## 2024-03-30 NOTE — PATIENT INSTRUCTIONS
Assessment/Plan   Problem List Items Addressed This Visit       Acute conjunctivitis of left eye - Primary   I am recommending follow-up with ophthalmology given patient has discontinued the Restasis drops.  I am sending azelastine drops to the pharmacy to help with the inflammation of the conjunctiva but at this time no findings that raise suspicion for bacterial conjunctivitis.  Perhaps her dry eye syndrome is playing a role in her symptoms especially as it is currently untreated

## 2024-04-03 ENCOUNTER — APPOINTMENT (OUTPATIENT)
Dept: RADIOLOGY | Facility: CLINIC | Age: 79
End: 2024-04-03
Payer: MEDICARE

## 2024-04-03 ENCOUNTER — APPOINTMENT (OUTPATIENT)
Dept: CARDIOLOGY | Facility: CLINIC | Age: 79
End: 2024-04-03
Payer: MEDICARE

## 2024-04-10 ENCOUNTER — APPOINTMENT (OUTPATIENT)
Dept: RADIOLOGY | Facility: CLINIC | Age: 79
End: 2024-04-10
Payer: MEDICARE

## 2024-04-10 ENCOUNTER — EVALUATION (OUTPATIENT)
Dept: PHYSICAL THERAPY | Facility: CLINIC | Age: 79
End: 2024-04-10
Payer: MEDICARE

## 2024-04-10 ENCOUNTER — APPOINTMENT (OUTPATIENT)
Dept: CARDIOLOGY | Facility: CLINIC | Age: 79
End: 2024-04-10
Payer: MEDICARE

## 2024-04-10 DIAGNOSIS — M25.512 CHRONIC LEFT SHOULDER PAIN: ICD-10-CM

## 2024-04-10 DIAGNOSIS — M25.512 CHRONIC PAIN IN LEFT SHOULDER: Primary | ICD-10-CM

## 2024-04-10 DIAGNOSIS — G89.29 CHRONIC PAIN IN LEFT SHOULDER: Primary | ICD-10-CM

## 2024-04-10 DIAGNOSIS — G89.29 CHRONIC LEFT SHOULDER PAIN: ICD-10-CM

## 2024-04-10 PROCEDURE — 97110 THERAPEUTIC EXERCISES: CPT | Mod: GP

## 2024-04-10 PROCEDURE — 97161 PT EVAL LOW COMPLEX 20 MIN: CPT | Mod: GP

## 2024-04-10 ASSESSMENT — COLUMBIA-SUICIDE SEVERITY RATING SCALE - C-SSRS
6. HAVE YOU EVER DONE ANYTHING, STARTED TO DO ANYTHING, OR PREPARED TO DO ANYTHING TO END YOUR LIFE?: NO
1. IN THE PAST MONTH, HAVE YOU WISHED YOU WERE DEAD OR WISHED YOU COULD GO TO SLEEP AND NOT WAKE UP?: NO
2. HAVE YOU ACTUALLY HAD ANY THOUGHTS OF KILLING YOURSELF?: NO

## 2024-04-10 ASSESSMENT — PATIENT HEALTH QUESTIONNAIRE - PHQ9
SUM OF ALL RESPONSES TO PHQ9 QUESTIONS 1 AND 2: 0
1. LITTLE INTEREST OR PLEASURE IN DOING THINGS: NOT AT ALL
2. FEELING DOWN, DEPRESSED OR HOPELESS: NOT AT ALL

## 2024-04-10 NOTE — PROGRESS NOTES
Physical Therapy Evaluation    Patient Name María Clifford  MRN: [id number]  Today's Date: 04/10/24  Time Calculation  Start Time: 1335  Stop Time: 1415  Time Calculation (min): 40 min    Insurance: Payor: [employer] MEDICARE / Plan: [employer] GOLD CHOICE / Product Type: *No Product type* / EVAL $11.57 // VISITS ARE MED NEC NEEDS AUTH [place] PAYS % OOP 1450.00 [phone] APPLIED   -authorization required: yes  -number of visits authorized:  -Authorization/certification dates:  Next MD appointment: scheduled but not sure when-not in computer    Problem List Items Addressed This Visit             ICD-10-CM    Chronic pain in left shoulder - Primary M25.512, G89.29    Relevant Orders    Follow Up In Physical Therapy     Other Visit Diagnoses         Codes    Chronic left shoulder pain     M25.512, G89.29    Relevant Orders    Follow Up In Physical Therapy            Onset Date: 12/1/23  Referring Provider: Silvestre Macias MD   PT Orders: eval and treat    Assessment:    Impression/Clinical Presentation:L shoulder  pain with mild degeneration on xray presenting with possible RCT/impingement limiting function with dsg/lifting/reaching  Also with underlying cervical spine problems/postural deficits contributing to pain    Skilled PT services will aid in advancing functional status and attaining therapy goals.    Problem List:  -activity limitations  -Functional limitations  -Pain L shoulder  -decreased  ROM  -decreased strength   -posture awareness  -decreased knowledge of HEP      Goals:  STG 2 wks  Compliant with HEP  Dec pain 25%    LTG by discharge  I HEP  Improve functional outcome score to indicate improved functional mobility  Dec pain % on pain scale   Inc AROM L shoulder WNL with improved dsg  Inc LUE strength 1 grade with improved lifting ability to assist  at work  Inc posture awareness  Inc cervical ROM WFL    Rehab Potential:  good    Clinical  Presentation:    stable/and/or uncomplicated characteristics                                              Level of Complexity: low  Plan:    Therapeutic exercise, Manual therapy, Home program instruction and progression, Neuromuscular re-education, Therapeutic activities, Self care and home management, Instruction in activity modification, Electrical stimulation, Vasopneumatic device + cold, and Cryotherapy    UBE for soft tissue warmup, posture instruction/exercises, ROM/ strengthening shoulder, scapular strengthening/stabilization, manual and modalities prn, upper quadrant stretches    2 x week  until goals met or maximum rehab potential met  Pt is currently scheduled for 4 weeks    Plan of care was designed with input and agreement by the patient    Subjective:    Current Episode of Functional Impairment and/or Pain :  Chief complaint/HPI: insidious onset of L shoulder pain  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, transportation for older individuals, helps  with construction    Objective:    Ortho:  AROM (PROM)  RUE WNL  LUE  shoulder flexion: 103 (140 pain)  shoulder abduction: 93 (140  pain)  shoulder extension: 50  shoulder IR: 90  shoulder ER: 55  elbow extension: WNL  elbow flexion: WNL    Cervical %  BB 25% pain down into scapular region  RSB 40%  LSB 25%  RR 90%  LR 25%      Strength   shoulder flexion: 3+                 abduction: 3+  deltoid substitution                 IR: 3+                 ER: 3+  biceps: 4-  triceps: 4-    Special Tests  impingement sign: + difficulty with position  apleys ER: -  apleys IR: +  speeds:   empty can: -  drop arm: -  push test: unable to obtain test position  Quadrant -    Posture: RS/FH, slouched posture sitting    Palpation:no POP    Outcome Measures:  Other Measures  Disability of Arm Shoulder Hand (DASH): 27     Treatment:    PT Evaluation Time Entry  PT Evaluation (Low) Time Entry: 25  minutes    Therapeutic Exercise:                             15 minutes  Supine wand flexion 10x  Supine wand ER 10x  Seated abduction wand 10x  Scapular retraction 10x             Response to treatment: improved knowledge and understanding of condition    Education: Educated on relevant anatomy and expected plan of care  Instructed in initial HEP including reasoning of given exercises and issued written instructions  V/c's to complete exercises

## 2024-04-16 ENCOUNTER — APPOINTMENT (OUTPATIENT)
Dept: RADIOLOGY | Facility: CLINIC | Age: 79
End: 2024-04-16
Payer: MEDICARE

## 2024-04-16 ENCOUNTER — APPOINTMENT (OUTPATIENT)
Dept: CARDIOLOGY | Facility: CLINIC | Age: 79
End: 2024-04-16
Payer: MEDICARE

## 2024-04-18 ENCOUNTER — TREATMENT (OUTPATIENT)
Dept: PHYSICAL THERAPY | Facility: CLINIC | Age: 79
End: 2024-04-18
Payer: MEDICARE

## 2024-04-18 DIAGNOSIS — M25.512 CHRONIC PAIN IN LEFT SHOULDER: ICD-10-CM

## 2024-04-18 DIAGNOSIS — G89.29 CHRONIC LEFT SHOULDER PAIN: ICD-10-CM

## 2024-04-18 DIAGNOSIS — G89.29 CHRONIC PAIN IN LEFT SHOULDER: ICD-10-CM

## 2024-04-18 DIAGNOSIS — M25.512 CHRONIC LEFT SHOULDER PAIN: ICD-10-CM

## 2024-04-18 PROCEDURE — 97140 MANUAL THERAPY 1/> REGIONS: CPT | Mod: GP,CQ

## 2024-04-18 PROCEDURE — 97110 THERAPEUTIC EXERCISES: CPT | Mod: GP,CQ

## 2024-04-18 NOTE — PROGRESS NOTES
Physical Therapy Treatment Note      Patient Name María Clifford  MRN: [id number]  Today's Date: 04/18/24  Time Calculation  Start Time: 0835  Stop Time: 0915  Time Calculation (min): 40 min    Assessment:  skilled intervention: exercise reinstruction and progression    Patient would continue to benefit from skilled PT to address remaining functional, objective and subjective deficits to allow them to return to full independence with ADLs     Enedina rx well, updated HEP and issued RTB /loop.    Plan:  Upper quarter stretches    Therapy Diagnoses:   1. Chronic pain in left shoulder  Follow Up In Physical Therapy      2. Chronic left shoulder pain  Follow Up In Physical Therapy          Visit #: 2  Date of Onset:  12/1/23    Insurance:  : Payor: HUMANA MEDICARE / Plan: HUMANA GOLD CHOICE / Product Type: *No Product type* / EVAL $11.57 // VISITS ARE MED NEC NEEDS AUTH [employer] PAYS % OOP 1450.00 429.78 APPLIED   -authorization required: yes  -number of visits authorized:8  -Authorization/certification dates:  4/10/24-  5/31/24  Next MD appointment: scheduled but not sure when-not in computer-authorization required:           Subjective:  General: Compliant with HEP. Some muscle soreness from the exercises. Reports the overall discomfort has moved up closer to the shoulder.    Functional Progress: No change thus far.      Pain  Pain assessment 0-10  Pain score: 4  Pain location: L upper arm/shoulder  Type: achy        Compliant with HEP:  yes    Understanding of HEP: georgina able as she states she does not have a really good memory    Precautions: no fall risk    Objective:      Therapeutic Exercise    25 minutes    see below  **indicates new exercises or progression  NP=not performed    Neuromuscular Re-education:    5 minutes    See below  **indicates new exercises or progression  NP=not performed    Therapeutic Activity:      Manual  Supine for PROM/AAROM/grade I joint mobs L  shoulder    10 minutes    Modalities    Electric Stimulation    minutes      Vasopneumatic Device    minutes    Other     Exercise Log:  UBE 5' F **  Pulleys flex/elevation 20x **  Supine wand flexion 10x  Supine wand ER 10x re instruct  Seated abduction wand 15x  reinstruct  Scapular retraction 10x  reinstruct  Doorway way stretch 10 sec 10x **  Towel B wall slides for flexion 5 sec 2 x 10 **  Mid rows RTB 2 x 10**           Education:  Reviewed correct posture    Access Code: [email or web address]  URL: [email or web address]  Date: 04/18/2024  Prepared by: Erin    Exercises  - Doorway Pec Stretch at 60 Elevation  - 1 x daily - 7 x weekly - 1 sets - 10 reps - 10 hold  - Standing Shoulder Row with Anchored Resistance  - 1 x daily - 7 x weekly - 2 sets - 10 reps - 5  hold  - Shoulder Flexion Wall Slide with Towel  - 1 x daily - 7 x weekly - 2 sets - 10 reps - 5 hold

## 2024-04-23 ENCOUNTER — TREATMENT (OUTPATIENT)
Dept: PHYSICAL THERAPY | Facility: CLINIC | Age: 79
End: 2024-04-23
Payer: MEDICARE

## 2024-04-23 DIAGNOSIS — G89.29 CHRONIC LEFT SHOULDER PAIN: ICD-10-CM

## 2024-04-23 DIAGNOSIS — G89.29 CHRONIC PAIN IN LEFT SHOULDER: Primary | ICD-10-CM

## 2024-04-23 DIAGNOSIS — M25.512 CHRONIC LEFT SHOULDER PAIN: ICD-10-CM

## 2024-04-23 DIAGNOSIS — M25.512 CHRONIC PAIN IN LEFT SHOULDER: Primary | ICD-10-CM

## 2024-04-23 PROCEDURE — 97140 MANUAL THERAPY 1/> REGIONS: CPT | Mod: GP

## 2024-04-23 PROCEDURE — 97110 THERAPEUTIC EXERCISES: CPT | Mod: GP

## 2024-04-23 NOTE — PROGRESS NOTES
Physical Therapy Treatment Note      Patient Name María Clifford  MRN: [id number]  Today's Date: 04/23/24  Time Calculation  Start Time: 0950  Stop Time: 1030  Time Calculation (min): 40 min    Insurance: Payor: HUMANA MEDICARE / Plan: HUMANA GOLD CHOICE / Product Type: *No Product type* / EVAL $11.57 // VISITS ARE MED NEC NEEDS AUTH [employer] PAYS % OOP 1450.00 429.78 APPLIED   Visit #: 3  Date of Onset : 12/1/23   -authorization required: yes  -number of visits authorized 8  -authorization/ certification dates:4/10/24- 5/31/24     General:  Next MD appt:scheduled but not sure when-not in computer     Problem List Items Addressed This Visit             ICD-10-CM    Chronic pain in left shoulder - Primary M25.512, G89.29    Chronic left shoulder pain M25.512, G89.29       Assessment:  skilled intervention: exercise progression for flexibility    Patient would continue to benefit from skilled PT to address remaining functional, objective and subjective deficits to allow them to return to full independence with ADLs     Some re-instruction required for exercise performance  Good tolerance for progression to upper quadrant stretches with sx relief  Some pain with PROM end range flexion  Good progression with AROM    Plan:  Flex/abd to 90    Precautions: no fall risk    Subjective:  General:  Pain is dec    Functional Progress:  Dsg is improving    Pain  Pain assessment 0-10  Pain score: 2-3  Pain location: L upper arm/shoulder    Compliant with HEP:  yes    Understanding of HEP: needs review    Objective:  Therapeutic Exercise  25 minutes  see below  **indicates new exercises or progression  NP=not performed    Neuromuscular Re-education:  5 minutes  See below  **indicates new exercises or progression  NP=not performed    Manual Therapy:    Supine for PROM/grade Iii joint mobs L shoulder /STM anterior L shoulder/upper arm  10 minutes      Other   AROM L  shoulder  Flexion 130  Abduction 98  ER 67    Exercise Log:  UBE 5' F   Pulleys flex/abduction 20x   Supine wand ER 10x   Scapular retraction 10x    Doorway way stretch 10 sec 10x   L upper trap stretch 10 sec 5x**  L levator stretch 10 sec 5x **    Mid rows green 2 x 10 **  Bicep curls 2# 10x2**  Bent rows 2# 10x2 **    Supine AROM flexion 10x2**  Sidelying shoulder abduction 10x2**    Supine wand flexion 10x (HEP)  Seated abduction wand 15x  (HEP)  Towel B wall slides for flexion 5 sec 2 x 10 (HEP)    Education:  Instructed in progression of exercises and issued written instructions  Re-instructed mid rows

## 2024-04-25 ENCOUNTER — TREATMENT (OUTPATIENT)
Dept: PHYSICAL THERAPY | Facility: CLINIC | Age: 79
End: 2024-04-25
Payer: MEDICARE

## 2024-04-25 DIAGNOSIS — G89.29 CHRONIC PAIN IN LEFT SHOULDER: Primary | ICD-10-CM

## 2024-04-25 DIAGNOSIS — G89.29 CHRONIC LEFT SHOULDER PAIN: ICD-10-CM

## 2024-04-25 DIAGNOSIS — M25.512 CHRONIC LEFT SHOULDER PAIN: ICD-10-CM

## 2024-04-25 DIAGNOSIS — M25.512 CHRONIC PAIN IN LEFT SHOULDER: Primary | ICD-10-CM

## 2024-04-25 PROCEDURE — 97110 THERAPEUTIC EXERCISES: CPT | Mod: GP,CQ

## 2024-04-25 PROCEDURE — 97140 MANUAL THERAPY 1/> REGIONS: CPT | Mod: GP,CQ

## 2024-04-25 NOTE — PROGRESS NOTES
Physical Therapy Treatment Note      Patient Name María Clifford  MRN: [id number]  Today's Date: 04/25/24  Time Calculation  Start Time: 0200  Stop Time: 0245  Time Calculation (min): 45 min    Insurance: Payor: HUMANA MEDICARE / Plan: HUMANA GOLD CHOICE / Product Type: *No Product type* / EVAL $11.57 // VISITS ARE MED NEC NEEDS AUTH [employer] PAYS % OOP 1450.00 429.78 APPLIED   Visit #: 4  Date of Onset : 12/1/23   -authorization required: yes  -number of visits authorized 8  -authorization/ certification dates:4/10/24- 5/31/24     General:  Next MD appt:scheduled but not sure when-not in computer     Problem List Items Addressed This Visit             ICD-10-CM    Chronic pain in left shoulder - Primary M25.512, G89.29    Chronic left shoulder pain M25.512, G89.29       Assessment:  skilled intervention: exercise progression for AROM    Patient would continue to benefit from skilled PT to address remaining functional, objective and subjective deficits to allow them to return to full independence with ADLs     Some re-instruction required for exercise performance    Some pain with PROM end range flexion  Reported decreased pain post rx.    Plan:  Ball rolling on wall    Precautions: no fall risk    Subjective:  General:  Reports it is feeling a little better.    Functional Progress:  Dsg is improving    Pain  Pain assessment 0-10  Pain score: 2-3  Pain location: L upper arm/shoulder    Compliant with HEP:  yes    Understanding of HEP: needs review    Objective:  Therapeutic Exercise  30 minutes  see below  **indicates new exercises or progression  NP=not performed    Neuromuscular Re-education:  5 minutes  See below  **indicates new exercises or progression  NP=not performed    Manual Therapy:    Supine for PROM/grade Iii joint mobs L shoulder /STM anterior L shoulder/upper arm  10 minutes      Other       Exercise Log:  UBE 5' F   Pulleys  flex/abduction 20x   Supine wand ER 10x   Scapular retraction 10x    Doorway way stretch 10 sec 10x   L upper trap stretch 10 sec 5x  L levator stretch 10 sec 5x re instruct    Mid rows green 2 x 10   Bicep curls 2# 10x2  Bent rows 2# 10x2   Standing flex to 90 10x **  Standing abduction to 90 attempted but unable   Scaption 10x **    Supine AROM flexion 10x2  Sidelying shoulder abduction 10x    Supine wand flexion 10x (HEP)  Seated abduction wand 15x  (HEP)  Towel B wall slides for flexion 5 sec 2 x 10    Education:  Instructed in progression of exercises and issued written instructions  Re-instructed mid rows, levator stretch      Access Code: [email or web address]  URL: [email or web address]  Date: 04/25/2024  Prepared by: Erin    Exercises  - Standing Shoulder Flexion to 90 Degrees  - 1 x daily - 7 x weekly - 1-2 sets - 10 reps - 5 hold  - Standing Shoulder Scaption  - 1 x daily - 7 x weekly - 1-2 sets - 10 reps - 5 hold

## 2024-04-26 ENCOUNTER — TELEPHONE (OUTPATIENT)
Dept: PRIMARY CARE | Facility: CLINIC | Age: 79
End: 2024-04-26

## 2024-04-26 DIAGNOSIS — R07.89 ATYPICAL CHEST PAIN: ICD-10-CM

## 2024-04-26 DIAGNOSIS — E78.5 HYPERLIPIDEMIA, UNSPECIFIED HYPERLIPIDEMIA TYPE: Primary | ICD-10-CM

## 2024-04-30 ENCOUNTER — TREATMENT (OUTPATIENT)
Dept: PHYSICAL THERAPY | Facility: CLINIC | Age: 79
End: 2024-04-30
Payer: MEDICARE

## 2024-04-30 DIAGNOSIS — M25.512 CHRONIC PAIN IN LEFT SHOULDER: Primary | ICD-10-CM

## 2024-04-30 DIAGNOSIS — G89.29 CHRONIC LEFT SHOULDER PAIN: ICD-10-CM

## 2024-04-30 DIAGNOSIS — G89.29 CHRONIC PAIN IN LEFT SHOULDER: Primary | ICD-10-CM

## 2024-04-30 DIAGNOSIS — M25.512 CHRONIC LEFT SHOULDER PAIN: ICD-10-CM

## 2024-04-30 PROCEDURE — 97110 THERAPEUTIC EXERCISES: CPT | Mod: GP

## 2024-04-30 PROCEDURE — 97112 NEUROMUSCULAR REEDUCATION: CPT | Mod: GP

## 2024-04-30 PROCEDURE — 97140 MANUAL THERAPY 1/> REGIONS: CPT | Mod: GP

## 2024-04-30 NOTE — PROGRESS NOTES
Physical Therapy Treatment Note      Patient Name María Clifford  MRN: [id number]  Today's Date: 04/30/24  Time Calculation  Start Time: 0910  Stop Time: 0950  Time Calculation (min): 40 min    Insurance: Payor: HUMANA MEDICARE / Plan: HUMANA GOLD CHOICE / Product Type: *No Product type* /  EVAL $11.57 // VISITS ARE MED NEC NEEDS AUTH [employer] PAYS % OOP 1450.00 429.78 APPLIED   Visit #: 5  Date of Onset: 12/1/23   -authorization required: yes  -number of visits authorized 8  -authorization/ certification dates: 4/10/24- 5/31/24     General:  Next MD appt: scheduled but not sure when-not in computer     Problem List Items Addressed This Visit             ICD-10-CM    Chronic pain in left shoulder - Primary M25.512, G89.29       Assessment:  skilled intervention: exercise progression for scapular stability    Patient would continue to benefit from skilled PT to address remaining functional, objective and subjective deficits to allow them to return to full independence with ADLs     Exercise performance improving however still requires cues  Tolerated inc resistance and progression of scapular stabilization  Seems to delay processing of instructions and often requires re-instruction, difficulty with multiple step commands     Plan:  Serratus wall slides  Wall clocks    Precautions: no fall risk    Subjective:  General:  Getting better  Neck tightness is better  Got 2# weights for home and doing home videos of Silver Sneakers    Functional Progress:  Did 9 hrs of housework over the weekend    Pain  Pain assessment 0-10  Pain score: 2-3  Pain location: L upper arm/shoulder    Compliant with HEP:  yes    Understanding of HEP: needs review    Objective:  Therapeutic Exercise  20 minutes  see below  **indicates new exercises or progression  NP=not performed    Neuromuscular Re-education:  10 minutes  See below  **indicates new exercises or progression  NP=not  performed    Manual Therapy:    Supine for PROM/grade III joint mobs L shoulder /STM anterior L shoulder/upper arm   10 minutes      Other     Exercise Log:  UBE 5' F   Pulleys flex/abduction 20x   Supine wand ER 10x   Scapular retraction 10x    Doorway way stretch 10 sec 10x   L upper trap stretch 10 sec 5x  L levator stretch 10 sec 5x    Mid rows green 2 x 10   Wall push ups 10x2 **  Bicep curls 3# 10x2 **  Bent rows 3# 10x2  **  Standing flex to 90 10x 1# **  Standing abduction to 90 attempted but unable   Scaption 10x   ball on wall 10x cw ccw **  Supine AROM flexion 10x2 1# **  Sidelying shoulder abduction 10x  Supine ABC 1# 1x **  Supine serratus punches 1# 10x 2 **     Supine wand flexion 10x (HEP)  Seated abduction wand 15x  (HEP)  Towel B wall slides for flexion 5 sec 2 x 10 (HEP)    Education:  Instructed in progression of exercises  V/c's for exercises

## 2024-05-02 ENCOUNTER — TREATMENT (OUTPATIENT)
Dept: PHYSICAL THERAPY | Facility: CLINIC | Age: 79
End: 2024-05-02
Payer: MEDICARE

## 2024-05-02 DIAGNOSIS — M25.512 CHRONIC LEFT SHOULDER PAIN: ICD-10-CM

## 2024-05-02 DIAGNOSIS — M25.512 CHRONIC PAIN IN LEFT SHOULDER: ICD-10-CM

## 2024-05-02 DIAGNOSIS — G89.29 CHRONIC LEFT SHOULDER PAIN: ICD-10-CM

## 2024-05-02 DIAGNOSIS — G89.29 CHRONIC PAIN IN LEFT SHOULDER: ICD-10-CM

## 2024-05-02 PROCEDURE — 97140 MANUAL THERAPY 1/> REGIONS: CPT | Mod: GP,CQ

## 2024-05-02 PROCEDURE — 97110 THERAPEUTIC EXERCISES: CPT | Mod: GP,CQ

## 2024-05-02 PROCEDURE — 97112 NEUROMUSCULAR REEDUCATION: CPT | Mod: GP,CQ

## 2024-05-02 NOTE — PROGRESS NOTES
Physical Therapy Treatment Note      Patient Name María Clifford  MRN: [id number]  Today's Date: 05/02/24  Time Calculation  Start Time: 0230  Stop Time: 0310  Time Calculation (min): 40 min    Insurance: Payor: HUMANA MEDICARE / Plan: HUMANA GOLD CHOICE / Product Type: *No Product type* /  EVAL $11.57 // VISITS ARE MED NEC NEEDS AUTH [employer] PAYS % OOP 1450.00 429.78 APPLIED   Visit #: 6  Date of Onset: 12/1/23   -authorization required: yes  -number of visits authorized 8  -authorization/ certification dates: 4/10/24- 5/31/24     General:  Next MD appt: scheduled but not sure when-not in computer     Problem List Items Addressed This Visit             ICD-10-CM    Chronic pain in left shoulder M25.512, G89.29    Chronic left shoulder pain M25.512, G89.29       Assessment:  skilled intervention: exercise progression for scapular stability    Patient would continue to benefit from skilled PT to address remaining functional, objective and subjective deficits to allow them to return to full independence with ADLs     Exercise performance improving however still requires cues  Tolerated  progression of scapular stabilization with wall clocks and serratus wall slides, which presented good challenge for. Pt.  Seems to delay processing of instructions and often requires re-instruction, difficulty with multiple step commands     Plan:  Attempt to inc mid rows    Precautions: no fall risk    Subjective:  General:  Getting better      Functional Progress:  Did 9 hrs of housework over the weekend    Pain  Pain assessment 0-10  Pain score: 1-2  Pain location: L upper arm/shoulder    Compliant with HEP:  yes    Understanding of HEP: needs review    Objective:  Therapeutic Exercise  20 minutes  see below  **indicates new exercises or progression  NP=not performed    Neuromuscular Re-education:  10 minutes  See below  **indicates new exercises or  progression  NP=not performed    Manual Therapy:    Supine for PROM/grade III joint mobs L shoulder /STM anterior L shoulder/upper arm   10 minutes      Other     Exercise Log:  UBE 5' F   Pulleys flex/abduction 20x   Supine wand ER 10x   Scapular retraction 10x    Doorway way stretch 10 sec 10x   L upper trap stretch 10 sec 5x  L levator stretch 10 sec 5x    Mid rows green 2 x 10   Wall push ups 10x2   Bicep curls 3# 10x2   Bent rows 3# 10x2    Standing flex to 90 10x   2  1#   Standing abduction to 90 attempted but unable   Scaption 10x   ball on wall 10x cw ccw   Supine AROM flexion 10x2 1#   Sidelying shoulder abduction 10x 1 **  Supine ABC 1# 1x   Supine serratus punches 1# 10x 2 **  Serratus wall slides 10x **  Wall clocks 11  9  7  5x YTB each **     Supine wand flexion 10x (HEP)  Seated abduction wand 15x  (HEP)  Towel B wall slides for flexion 5 sec 2 x 10 (HEP)    Education:  Instructed in progression of exercises  V/c's for exercises

## 2024-05-06 DIAGNOSIS — R09.81 SINUS CONGESTION: Primary | ICD-10-CM

## 2024-05-06 RX ORDER — AZITHROMYCIN 250 MG/1
TABLET, FILM COATED ORAL DAILY
Qty: 6 TABLET | Refills: 0 | Status: SHIPPED | OUTPATIENT
Start: 2024-05-06 | End: 2024-05-11

## 2024-05-06 RX ORDER — FLUTICASONE PROPIONATE 50 MCG
1 SPRAY, SUSPENSION (ML) NASAL DAILY
Qty: 16 G | Refills: 5 | Status: SHIPPED | OUTPATIENT
Start: 2024-05-06 | End: 2025-05-06

## 2024-05-07 ENCOUNTER — TREATMENT (OUTPATIENT)
Dept: PHYSICAL THERAPY | Facility: CLINIC | Age: 79
End: 2024-05-07
Payer: MEDICARE

## 2024-05-07 DIAGNOSIS — G89.29 CHRONIC PAIN IN LEFT SHOULDER: ICD-10-CM

## 2024-05-07 DIAGNOSIS — G89.29 CHRONIC LEFT SHOULDER PAIN: ICD-10-CM

## 2024-05-07 DIAGNOSIS — M25.512 CHRONIC PAIN IN LEFT SHOULDER: ICD-10-CM

## 2024-05-07 DIAGNOSIS — M25.512 CHRONIC LEFT SHOULDER PAIN: ICD-10-CM

## 2024-05-07 PROCEDURE — 97112 NEUROMUSCULAR REEDUCATION: CPT | Mod: GP

## 2024-05-07 PROCEDURE — 97140 MANUAL THERAPY 1/> REGIONS: CPT | Mod: GP

## 2024-05-07 PROCEDURE — 97110 THERAPEUTIC EXERCISES: CPT | Mod: GP

## 2024-05-07 NOTE — PROGRESS NOTES
Physical Therapy Treatment Note      Patient Name María Clifford  MRN: [id number]  Today's Date: 05/07/24  Time Calculation  Start Time: 1040  Stop Time: 1120  Time Calculation (min): 40 min    Insurance: Payor: HUMANA MEDICARE / Plan: HUMANA GOLD CHOICE / Product Type: *No Product type* / EVAL $11.57 // VISITS ARE MED NEC NEEDS AUTH [employer] PAYS % OOP 1450.00 429.78 APPLIED   Visit #: 7   Date of Onset:12/1/23   -authorization required: yes  -number of visits authorized 8  -authorization/ certification dates:4/10/24- 5/31/24     General:  Next MD appt: scheduled but not sure when-not in computer     Problem List Items Addressed This Visit             ICD-10-CM    Chronic pain in left shoulder M25.512, G89.29    Chronic left shoulder pain M25.512, G89.29       Assessment:  skilled intervention: exercise progression for scapular stability    Patient would continue to benefit from skilled PT to address remaining functional, objective and subjective deficits to allow them to return to full independence with ADLs     V/c's to keep count/some re-instruction required  Inc tband resistance w/o difficulty    Plan:  1 more visit then discharge to [facility]    Precautions: no fall risk    Subjective:  General:  Pain level remains low and reports doing well, with HEP    Functional Progress:  Sleeping well  Reaching into cupboards getting easier    Pain  Pain assessment 0-10  Pain score:  Pain location: L upper arm/shoulder    Compliant with HEP:  yes    Understanding of HEP: review needed    Objective:  Therapeutic Exercise  20 minutes  see below  **indicates new exercises or progression  NP=not performed    Neuromuscular Re-education:  10 minutes  See below  **indicates new exercises or progression  NP=not performed    Manual Therapy:    Supine for PROM/grade III joint mobs L shoulder /STM anterior L shoulder/upper arm   10 minutes        Other     Exercise Log:  UBE  5' F   Pulleys flex/abduction 20x   Supine wand ER 10x   Scapular retraction 10x    Doorway way stretch 10 sec 10x   L upper trap stretch 10 sec 5x  L levator stretch 10 sec 5x    Mid rows blue 2 x 10 **  Wall push ups 10x2   Bicep curls 3# 10x2   Bent rows 3# 10x2    Standing flex to 90 10x   2  1#   Standing abduction to 90 attempted but unable   Scaption 10x 2   1# **  ball on wall 10x cw ccw   Supine AROM flexion 10x2 1#   Sidelying shoulder abduction 10x 1   Supine ABC 1# 1x   Supine serratus punches 1# 10x 2   Serratus wall slides 10x red **  Wall clocks 11  9  7  5x red **  Supine wand flexion 10x (HEP)  Seated abduction wand 15x  (HEP)  Towel B wall slides for flexion 5 sec 2 x 10 (HEP)    Education:  Instructed in progression of exercises  Issued blue tband and re-issued written miracle of HEP

## 2024-05-09 ENCOUNTER — TREATMENT (OUTPATIENT)
Dept: PHYSICAL THERAPY | Facility: CLINIC | Age: 79
End: 2024-05-09
Payer: MEDICARE

## 2024-05-09 DIAGNOSIS — G89.29 CHRONIC LEFT SHOULDER PAIN: ICD-10-CM

## 2024-05-09 DIAGNOSIS — M25.512 CHRONIC PAIN IN LEFT SHOULDER: ICD-10-CM

## 2024-05-09 DIAGNOSIS — G89.29 CHRONIC PAIN IN LEFT SHOULDER: ICD-10-CM

## 2024-05-09 DIAGNOSIS — M25.512 CHRONIC LEFT SHOULDER PAIN: ICD-10-CM

## 2024-05-09 PROCEDURE — 97112 NEUROMUSCULAR REEDUCATION: CPT | Mod: GP

## 2024-05-09 PROCEDURE — 97110 THERAPEUTIC EXERCISES: CPT | Mod: GP

## 2024-05-09 PROCEDURE — 97140 MANUAL THERAPY 1/> REGIONS: CPT | Mod: GP

## 2024-05-09 NOTE — PROGRESS NOTES
Physical Therapy Treatment Note      Patient Name María Clifford  MRN: [id number]  Today's Date: 05/09/24  Time Calculation  Start Time: 0915  Stop Time: 1005  Time Calculation (min): 50 min  Session interrupted by fire alarm for 10'    Insurance: Payor: HUMANA MEDICARE / Plan: HUMANA GOLD CHOICE / Product Type: *No Product type* / EVAL $11.57 // VISITS ARE MED NEC NEEDS AUTH [employer] PAYS % OOP 1450.00 429.78 APPLIED   Visit #: 8   Date of Onset: 12/1/23   -authorization required: yes  -number of visits authorized 8  -authorization/ certification dates: 4/10/24- 5/31/24     General:  Next MD appt: scheduled but not sure when-not in computer     Problem List Items Addressed This Visit             ICD-10-CM    Chronic pain in left shoulder M25.512, G89.29    Chronic left shoulder pain M25.512, G89.29       Assessment:  skilled intervention: education for HEP, reasmt  Did well with exercise performance today with minimal cueing and pt does have written copies of all exericses    LTG's  I HEP (met)  Improve functional outcome score to indicate improved functional mobility (met)  Dec pain % on pain scale (met)  Inc AROM L shoulder WNL with improved dsg (met)  Inc LUE strength 1 grade with improved lifting ability to assist  at work(met)  Inc posture awareness(met)  Inc cervical ROM WFL(met)    Plan:  Discharge to [facility] for report period 4/10/24-5/9/24    Precautions: no fall risk    Subjective:  General:  Pain free today    Functional Progress:  Doing better with lifting  No difficulty with dsg    Pain  Pain assessment 0-10  Pain score: 0  Pain location: L shoulder    Compliant with HEP:  yes    Understanding of HEP: WNL    Objective:  Therapeutic Exercise  20 minutes  see below  **indicates new exercises or progression  NP=not performed    Neuromuscular Re-education:  10 minutes  See below  **indicates new exercises or progression  NP=not  performed    Manual Therapy:    Supine for PROM/grade III joint mobs L shoulder /STM anterior L shoulder/upper arm   10 minutes      Other   AROM   LUE  shoulder flexion: 170   (shoulder abduction: 150 shoulder extension: 50  shoulder IR: 90  shoulder ER: 75     Cervical %  BB 75%   RSB 75%  LSB 50%  %  LR 50%      Strength   shoulder flexion: 4+                 abduction: 4                   IR: 4                 ER: 4  biceps: 5  triceps: 5     Special Tests  impingement sign: -  apleys ER: -  apleys IR: -  speeds:   empty can: -  drop arm: -  push test: -  Quadrant -     Exercise Log:  UBE 5' F   Pulleys flex/abduction 20x   Supine wand ER 10x   Scapular retraction 10x    Doorway way stretch 10 sec 10x   L upper trap stretch 10 sec 5x  L levator stretch 10 sec 5x    Mid rows blue 2 x 10   Wall push ups 10x2   Bicep curls 3# 10x2   Bent rows 3# 10x2    Standing flex to 90 10x2  1#   Scaption 10x 2   1#   ball on wall 10x cw ccw   Supine AROM flexion 10x2 1#   Sidelying shoulder abduction 10x 1   Supine ABC 1# 1x   Supine serratus punches 1# 10x 2   Serratus wall slides 10x red   Wall clocks 11  9  7  5x red     Supine wand flexion 10x (HEP)  Seated abduction wand 15x  (HEP)  Towel B wall slides for flexion 5 sec 2 x 10 (HEP)    Education:  Review of HEP and progression    Outcome Measures:  Other Measures  Disability of Arm Shoulder Hand (DASH): 14

## 2024-05-23 PROBLEM — H81.12 BENIGN PAROXYSMAL POSITIONAL VERTIGO OF LEFT EAR: Status: ACTIVE | Noted: 2022-05-23

## 2024-05-23 PROBLEM — M19.90 ARTHRITIS: Status: ACTIVE | Noted: 2024-05-23

## 2024-05-23 PROBLEM — R60.0 PERIPHERAL EDEMA: Status: ACTIVE | Noted: 2020-06-22

## 2024-05-23 PROBLEM — E78.00 HYPERCHOLESTEROLEMIA: Chronic | Status: ACTIVE | Noted: 2023-05-18

## 2024-05-23 PROBLEM — M48.02 DEGENERATIVE CERVICAL SPINAL STENOSIS: Status: ACTIVE | Noted: 2022-05-23

## 2024-05-23 PROBLEM — I72.0 PSEUDOANEURYSM OF CAROTID ARTERY (CMS-HCC): Status: ACTIVE | Noted: 2022-04-14

## 2024-05-23 PROBLEM — H91.90 HEARING LOSS: Status: ACTIVE | Noted: 2019-11-26

## 2024-05-23 PROBLEM — I25.10 CAD (CORONARY ARTERY DISEASE): Chronic | Status: ACTIVE | Noted: 2023-05-18

## 2024-05-23 PROBLEM — R00.0 TACHYCARDIA: Chronic | Status: ACTIVE | Noted: 2023-05-18

## 2024-05-23 PROBLEM — R07.89 CHEST PAIN, ATYPICAL: Chronic | Status: ACTIVE | Noted: 2023-05-18

## 2024-05-23 PROBLEM — M85.80 OSTEOPENIA: Status: ACTIVE | Noted: 2019-11-26

## 2024-05-23 PROBLEM — L82.1 SEBORRHEIC KERATOSES: Status: ACTIVE | Noted: 2020-08-13

## 2024-05-23 PROBLEM — R10.13 DYSPEPSIA: Status: ACTIVE | Noted: 2022-12-14

## 2024-05-23 PROBLEM — R73.03 PREDIABETES: Status: ACTIVE | Noted: 2021-05-17

## 2024-05-23 PROBLEM — K21.9 GERD (GASTROESOPHAGEAL REFLUX DISEASE): Status: ACTIVE | Noted: 2024-05-23

## 2024-05-23 PROBLEM — R60.9 PERIPHERAL EDEMA: Status: ACTIVE | Noted: 2020-06-22

## 2024-06-18 PROBLEM — I10 HYPERTENSION: Chronic | Status: ACTIVE | Noted: 2023-05-18

## 2024-06-18 PROBLEM — I45.2 BIFASCICULAR BLOCK: Chronic | Status: ACTIVE | Noted: 2023-05-18

## 2024-06-18 NOTE — PROGRESS NOTES
Referred by No ref. provider found    HPI I'm seeing María for the 1st time in 5 years. Overall feeling ok. 3 x/ year she has CP randomly.  She remains active with exercise.  No SOB.  No Palp. 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  She was advised that she should follow-up with cardiology by her primary care doctor.  Her calcium score repeated in fact went up.  Her LDL cholesterol went up to 183.  She is on Zetia only.  Unable to tolerate statins.    Past Medical History:  Problem List Items Addressed This Visit    None       Past Medical History:   Diagnosis Date    CAD (coronary artery disease) 05/18/2023 9/5/2023 Elevated CAC 73.22 Agatston units (LM 66.1, LAD 7.12).  3/19/2019 CAC 50 (LM 48.2, LAD 1.5)   Prior CAC = 0    Chest pain, atypical 05/18/2023    Always at rest ... Pinching L upper chest ... 2x/wk lasting a minute    Disease of intestine, unspecified     Intestinal disorder    Disease of upper respiratory tract, unspecified     Upper respiratory disease    Hypercholesterolemia 05/18/2023    Baseline    Difficulty with statins    Personal history of other diseases of the circulatory system     History of cardiac murmur    Personal history of other diseases of the circulatory system     History of heart block    Personal history of other diseases of the digestive system     History of hepatic disease    Personal history of other diseases of the musculoskeletal system and connective tissue     History of arthritis    Personal history of other endocrine, nutritional and metabolic disease     History of hyperlipidemia    Personal history of other endocrine, nutritional and metabolic disease     History of hyperthyroidism    Personal history of other specified conditions     History of headache    Personal history of pneumonia (recurrent)     History of pneumonia    Personal history of transient ischemic attack (TIA), and cerebral infarction  without residual deficits     History of transient cerebral ischemia    Tachycardia 05/18/2023    Always above 100 bpm         Past Surgical History:  She has a past surgical history that includes Other surgical history (11/20/2014) and Colonoscopy (11/20/2014).      Social History:  She reports that she has never smoked. She has never used smokeless tobacco. She reports that she does not drink alcohol and does not use drugs.    Family History:  No family history on file.     Allergies:  Amoxicillin-pot clavulanate, Penicillins, Amoxicillin, Bee pollen, and House dust    Outpatient Medications:  Current Outpatient Medications   Medication Instructions    aspirin 81 mg, oral, Daily    atenolol (TENORMIN) 25 mg, oral, Daily    azelastine (Optivar) 0.05 % ophthalmic solution 1 drop, Left Eye, 2 times daily    benzonatate (TESSALON PERLES) 100 mg, oral, 3 times daily PRN    Bifidobacterium infantis (Align) 4 mg capsule     DOCOSAHEXAENOIC ACID ORAL omega 3-dha-epa-fish oil 967 mg-385 mg- 515 mg-1,290 mg capsule,delayed release(DR/EC) Take by mouth. 0 Active    ezetimibe (ZETIA) 10 mg, oral, Daily    fluticasone (Flonase) 50 mcg/actuation nasal spray 1 spray, Each Nostril, Daily, Shake gently. Before first use, prime pump. After use, clean tip and replace cap.    magnesium oxide (MAG-OX) 400 mg, Daily    magnesium oxide,aspartate,citr (Triple Magnesium Complex) 400 mg magnesium capsule 1 capsule, oral, Daily    nystatin (Mycostatin) 100,000 unit/gram powder 1 Application, Topical, 3 times daily    omega 3-dha-epa-fish oil 967 mg-385 mg- 515 mg-1,290 mg capsule,delayed release(DR/EC) oral    Synthroid 75 mcg, oral, Daily     Last Recorded Vitals:  There were no vitals filed for this visit.    Physical Exam    Physical  Patient is alert and oriented x3.  HEENT is unremarkable mucous members are moist  Neck no JVP no bruits upstrokes are full no thyromegaly  Lungs are clear bilaterally.  No wheezing crackles or rales  Heart  regular rhythm normal S1-S2 there is no S3 no murmurs are heard.  Abdomen is soft vessels are positive nontender nondistended no organomegaly no pulsatile masses  Extremities have no edema.  Distal pulses present palpable.  Neuro is grossly nonfocal  Skin has no rashes     Last Labs:  CBC -  Lab Results   Component Value Date    WBC 7.5 05/18/2023    HGB 13.4 05/18/2023    HCT 42.5 05/18/2023     05/18/2023     05/18/2023       CMP -  Lab Results   Component Value Date    CALCIUM 10.2 05/18/2023    PROT 7.0 05/18/2023    ALBUMIN 4.6 05/18/2023    AST 24 05/18/2023    ALT 21 05/18/2023    ALKPHOS 76 05/18/2023    BILITOT 0.4 05/18/2023     LIPID PANEL -   Lab Results   Component Value Date    CHOL 269 (H) 05/18/2023    HDL 64.5 05/18/2023    CHHDL 4.2 05/18/2023    VLDL 69 (H) 05/18/2023    TRIG 344 (H) 05/18/2023    NHDL 205 05/18/2023     RENAL FUNCTION PANEL -   Lab Results   Component Value Date    K 4.3 05/18/2023       Lab Results   Component Value Date    HGBA1C 5.8 (A) 05/18/2023    HGBA1C 5.7 (H) 06/27/2022     Procedure    CAC 9/5/2023 73 units    NUCLEAR STRESS (METS 10.10) Normal 70% EF.     ECHO (4/5/19) Abi LVF 60-65% EF. Pseudonormal pattern of LV diastolic filling.     HOLTER (3/20/19) Sinus (average 84 bpm)  bpm.      CAC (3/19/19) score = 49.7 (score was 0 on CAC 11/8/11) (57%). Bibasilar scarring/atel and some mild bronchi at lung base and middle lobe. Mod-LG hiatal hernia increased since presious. Mildly prom distal paraesophageal node tp to 5mm similar to prior. Lobulated hypodense mass in left hapatic lobe 5cm prob a cyst although increased in sz compared to previous.      CAC (11/8/11) score = 0         Assessment/Plan   1.  Bifascicular block.  Repeat EKG today.  No dizziness.  2.  CAD.  2011 calcium score of 0.  2019 the calcium score is 50 units.  Now up to 73.  She has vague symptoms of chest discomfort 3 times a year.  An EKG was done today.  Of asked her to have an  exercise nuclear stress test.  She had very good capacity in 2019.  Continue the baby aspirin, atenolol, and Zetia.  Unable to tolerate statins.  I advise she start a PCSK9 inhibitor  3.  Hyperlipidemia.  Baseline .  Intolerant to statins in the past.  5/18/2023  HDL 65 triglycerides 344.  Recently her LDL went back up to 183.  She is agreeable to trying Repatha.  A prescription will be written.  This will be sent to the  specialty pharmacy.  Fasting blood work will be obtained approximate 2 to 3 months after she is on the Repatha.    EKG today.  Exercise nuclear stress test in the near future.  Start Repatha.  Blood work in approximate 3 months.  See me back in 4 months.    Davonte Mays MD     Instructions and follow up

## 2024-06-19 ENCOUNTER — APPOINTMENT (OUTPATIENT)
Dept: CARDIOLOGY | Facility: CLINIC | Age: 79
End: 2024-06-19
Payer: MEDICARE

## 2024-06-19 VITALS
WEIGHT: 143 LBS | SYSTOLIC BLOOD PRESSURE: 112 MMHG | HEART RATE: 89 BPM | OXYGEN SATURATION: 96 % | DIASTOLIC BLOOD PRESSURE: 74 MMHG | BODY MASS INDEX: 28.88 KG/M2

## 2024-06-19 DIAGNOSIS — E78.00 HYPERCHOLESTEROLEMIA: Chronic | ICD-10-CM

## 2024-06-19 DIAGNOSIS — R00.2 PALPITATIONS: ICD-10-CM

## 2024-06-19 DIAGNOSIS — R07.89 CHEST PRESSURE: ICD-10-CM

## 2024-06-19 DIAGNOSIS — I10 PRIMARY HYPERTENSION: Chronic | ICD-10-CM

## 2024-06-19 DIAGNOSIS — I45.2 BIFASCICULAR BLOCK: Primary | Chronic | ICD-10-CM

## 2024-06-19 DIAGNOSIS — I72.0 PSEUDOANEURYSM OF CAROTID ARTERY (CMS-HCC): ICD-10-CM

## 2024-06-19 DIAGNOSIS — I25.10 CORONARY ARTERY DISEASE INVOLVING NATIVE CORONARY ARTERY OF NATIVE HEART WITHOUT ANGINA PECTORIS: Chronic | ICD-10-CM

## 2024-06-19 PROCEDURE — 3074F SYST BP LT 130 MM HG: CPT | Performed by: INTERNAL MEDICINE

## 2024-06-19 PROCEDURE — 1157F ADVNC CARE PLAN IN RCRD: CPT | Performed by: INTERNAL MEDICINE

## 2024-06-19 PROCEDURE — 1036F TOBACCO NON-USER: CPT | Performed by: INTERNAL MEDICINE

## 2024-06-19 PROCEDURE — 99204 OFFICE O/P NEW MOD 45 MIN: CPT | Performed by: INTERNAL MEDICINE

## 2024-06-19 PROCEDURE — 1159F MED LIST DOCD IN RCRD: CPT | Performed by: INTERNAL MEDICINE

## 2024-06-19 PROCEDURE — 1160F RVW MEDS BY RX/DR IN RCRD: CPT | Performed by: INTERNAL MEDICINE

## 2024-06-19 PROCEDURE — 3078F DIAST BP <80 MM HG: CPT | Performed by: INTERNAL MEDICINE

## 2024-06-19 PROCEDURE — 93000 ELECTROCARDIOGRAM COMPLETE: CPT | Performed by: INTERNAL MEDICINE

## 2024-06-19 NOTE — PATIENT INSTRUCTIONS
1.  Bifascicular block.  Repeat EKG today.  No dizziness.  2.  CAD.  2011 calcium score of 0.  2019 the calcium score is 50 units.  Now up to 73.  She has vague symptoms of chest discomfort 3 times a year.  An EKG was done today.  Of asked her to have an exercise nuclear stress test.  She had very good capacity in 2019.  Continue the baby aspirin, atenolol, and Zetia.  Unable to tolerate statins.  I advise she start a PCSK9 inhibitor  3.  Hyperlipidemia.  Baseline .  Intolerant to statins in the past.  5/18/2023  HDL 65 triglycerides 344.  Recently her LDL went back up to 183.  She is agreeable to trying Repatha.  A prescription will be written.  This will be sent to the  specialty pharmacy.  Fasting blood work will be obtained approximate 2 to 3 months after she is on the Repatha.    EKG today.  Exercise nuclear stress test in the near future.  Start Repatha.  Blood work in approximate 3 months.  See me back in 4 months.

## 2024-06-20 ENCOUNTER — SPECIALTY PHARMACY (OUTPATIENT)
Dept: PHARMACY | Facility: CLINIC | Age: 79
End: 2024-06-20

## 2024-06-20 ENCOUNTER — HOSPITAL ENCOUNTER (OUTPATIENT)
Dept: RADIOLOGY | Facility: CLINIC | Age: 79
Discharge: HOME | End: 2024-06-20
Payer: MEDICARE

## 2024-06-20 DIAGNOSIS — Z12.31 ENCOUNTER FOR SCREENING MAMMOGRAM FOR MALIGNANT NEOPLASM OF BREAST: ICD-10-CM

## 2024-06-20 PROCEDURE — 77063 BREAST TOMOSYNTHESIS BI: CPT | Performed by: RADIOLOGY

## 2024-06-20 PROCEDURE — 77067 SCR MAMMO BI INCL CAD: CPT

## 2024-06-20 PROCEDURE — 77067 SCR MAMMO BI INCL CAD: CPT | Performed by: RADIOLOGY

## 2024-06-21 ENCOUNTER — HOSPITAL ENCOUNTER (OUTPATIENT)
Dept: RADIOLOGY | Facility: EXTERNAL LOCATION | Age: 79
Discharge: HOME | End: 2024-06-21

## 2024-06-26 PROCEDURE — RXMED WILLOW AMBULATORY MEDICATION CHARGE

## 2024-06-27 DIAGNOSIS — E03.9 HYPOTHYROIDISM, UNSPECIFIED TYPE: ICD-10-CM

## 2024-06-27 RX ORDER — LEVOTHYROXINE SODIUM 75 UG/1
75 TABLET ORAL DAILY
Qty: 90 TABLET | Refills: 0 | Status: SHIPPED | OUTPATIENT
Start: 2024-06-27

## 2024-06-28 ENCOUNTER — PHARMACY VISIT (OUTPATIENT)
Dept: PHARMACY | Facility: CLINIC | Age: 79
End: 2024-06-28
Payer: COMMERCIAL

## 2024-07-01 ENCOUNTER — SPECIALTY PHARMACY (OUTPATIENT)
Dept: PHARMACY | Facility: CLINIC | Age: 79
End: 2024-07-01

## 2024-07-02 ENCOUNTER — TELEMEDICINE CLINICAL SUPPORT (OUTPATIENT)
Dept: PHARMACY | Facility: HOSPITAL | Age: 79
End: 2024-07-02
Payer: MEDICARE

## 2024-07-02 DIAGNOSIS — I25.10 CORONARY ARTERY DISEASE INVOLVING NATIVE CORONARY ARTERY OF NATIVE HEART WITHOUT ANGINA PECTORIS: Chronic | ICD-10-CM

## 2024-07-02 DIAGNOSIS — R07.89 CHEST PRESSURE: ICD-10-CM

## 2024-07-02 NOTE — PROGRESS NOTES
[facility] Clinical Note    María Clifford is a 78 y.o. female, who is on the specialty pharmacy service for management of:  Hyperlipidemia Core.    María Clifford is starting: Repatha Sureclick 140mg under the skin every 14 days.    Medication Receipt Date: 7/2/24  Medication Start Date (planned or actual): 7/4/24    María was contacted on 7/2/2024 at 1:28 PM for a virtual pharmacy visit with encounter number [id number] from:   [facility]  [address]  [address]  [facility] [address]  Dept: [phone]  Dept Fax: [phone]  Loc: [phone]    María was offered a Telemedicine Video visit or Telephone visit.  María consented to a Telephone visit, which was performed.    The most recent encounter visit with the referring prescriber Davonte Mays MD on 6/19/24 was reviewed.  Pharmacy will continue to collaborate in the care of this patient with the referring prescriber Davonte Mays MD.    General Assessment  Reviewed new-start education with patient. She primarily just wanted to review over administration steps/sites for administration, injection technique, disposal, and side effects. She declined need for full first fill assessment education.     Impression/Plan  IMPRESSION/PLAN:  Is patient high risk (potential patients:  pregnancy, geriatric, pediatric)?  Yes- geriatric  Is laboratory follow-up needed? As directed per provider  Is a clinical intervention needed? No  Next reassessment date? Approx. 10/2/24  Additional comments:     Refer to the encounter summary report for documentation details about patient counseling and education.      Medication Adherence    The importance of adherence was discussed with the patient and they were advised to take the medication as prescribed by their provider. Patient was encouraged to call their physician's office if they have a question regarding a missed dose.     QOL/Patient  Satisfaction  Rate your quality of life on scale of 1-10: -- (Not assessed)  Rate your satisfaction with  Specialty Pharmacy on scale of 1-10: -- (Not assessed)      Patient was advised to contact the pharmacy if there are any changes to their medication list, including prescriptions, OTC medications, herbal products, or supplements. Patient was advised of [facility]'s dispensing process, refill timeline, contact information ([phone]), and patient management follow up. Patient confirmed understanding of education conducted during assessment. All patient questions and concerns were addressed to the best of my ability. Patient was encouraged to contact the specialty pharmacy with any questions or concerns.    Confirmed follow-up outreaches are properly scheduled and reviewed goals of therapy with the patient.        Diya Jeffery, PharmD   154.9

## 2024-07-15 ENCOUNTER — HOSPITAL ENCOUNTER (OUTPATIENT)
Dept: RADIOLOGY | Facility: CLINIC | Age: 79
Discharge: HOME | End: 2024-07-15
Payer: MEDICARE

## 2024-07-15 ENCOUNTER — HOSPITAL ENCOUNTER (OUTPATIENT)
Dept: CARDIOLOGY | Facility: CLINIC | Age: 79
Discharge: HOME | End: 2024-07-15
Payer: MEDICARE

## 2024-07-15 DIAGNOSIS — I25.10 CORONARY ARTERY DISEASE INVOLVING NATIVE CORONARY ARTERY OF NATIVE HEART WITHOUT ANGINA PECTORIS: Chronic | ICD-10-CM

## 2024-07-15 DIAGNOSIS — R07.89 CHEST PRESSURE: ICD-10-CM

## 2024-07-15 DIAGNOSIS — L82.1 SEBORRHEIC KERATOSES: ICD-10-CM

## 2024-07-15 DIAGNOSIS — R00.2 PALPITATIONS: Primary | ICD-10-CM

## 2024-07-15 DIAGNOSIS — M79.602 PAIN OF LEFT UPPER EXTREMITY: Primary | ICD-10-CM

## 2024-07-15 PROCEDURE — 93016 CV STRESS TEST SUPVJ ONLY: CPT | Performed by: INTERNAL MEDICINE

## 2024-07-15 PROCEDURE — 78452 HT MUSCLE IMAGE SPECT MULT: CPT | Performed by: INTERNAL MEDICINE

## 2024-07-15 PROCEDURE — 93017 CV STRESS TEST TRACING ONLY: CPT

## 2024-07-15 PROCEDURE — 78452 HT MUSCLE IMAGE SPECT MULT: CPT

## 2024-07-15 PROCEDURE — 93018 CV STRESS TEST I&R ONLY: CPT | Performed by: INTERNAL MEDICINE

## 2024-08-27 ENCOUNTER — LAB (OUTPATIENT)
Dept: LAB | Facility: LAB | Age: 79
End: 2024-08-27
Payer: MEDICARE

## 2024-08-27 ENCOUNTER — APPOINTMENT (OUTPATIENT)
Dept: RHEUMATOLOGY | Facility: CLINIC | Age: 79
End: 2024-08-27
Payer: MEDICARE

## 2024-08-27 VITALS
SYSTOLIC BLOOD PRESSURE: 130 MMHG | WEIGHT: 140 LBS | BODY MASS INDEX: 27.48 KG/M2 | HEART RATE: 67 BPM | HEIGHT: 60 IN | DIASTOLIC BLOOD PRESSURE: 64 MMHG

## 2024-08-27 DIAGNOSIS — M35.01 SJOGREN'S SYNDROME WITH KERATOCONJUNCTIVITIS SICCA (MULTI): Primary | ICD-10-CM

## 2024-08-27 DIAGNOSIS — M35.01 SJOGREN'S SYNDROME WITH KERATOCONJUNCTIVITIS SICCA (MULTI): ICD-10-CM

## 2024-08-27 DIAGNOSIS — J45.909 MILD REACTIVE AIRWAYS DISEASE, UNSPECIFIED WHETHER PERSISTENT (HHS-HCC): ICD-10-CM

## 2024-08-27 LAB
BASOPHILS # BLD AUTO: 0.06 X10*3/UL (ref 0–0.1)
BASOPHILS NFR BLD AUTO: 0.8 %
CENTROMERE B AB SER-ACNC: <0.2 AI
CHROMATIN AB SERPL-ACNC: <0.2 AI
DSDNA AB SER-ACNC: <1 IU/ML
ENA JO1 AB SER QL IA: <0.2 AI
ENA RNP AB SER IA-ACNC: <0.2 AI
ENA SCL70 AB SER QL IA: <0.2 AI
ENA SM AB SER IA-ACNC: <0.2 AI
ENA SM+RNP AB SER QL IA: <0.2 AI
ENA SS-A AB SER IA-ACNC: <0.2 AI
ENA SS-B AB SER IA-ACNC: <0.2 AI
EOSINOPHIL # BLD AUTO: 0.15 X10*3/UL (ref 0–0.4)
EOSINOPHIL NFR BLD AUTO: 1.9 %
ERYTHROCYTE [DISTWIDTH] IN BLOOD BY AUTOMATED COUNT: 13.7 % (ref 11.5–14.5)
ERYTHROCYTE [SEDIMENTATION RATE] IN BLOOD BY WESTERGREN METHOD: 5 MM/H (ref 0–30)
HCT VFR BLD AUTO: 42.1 % (ref 36–46)
HGB BLD-MCNC: 13.8 G/DL (ref 12–16)
IMM GRANULOCYTES # BLD AUTO: 0.09 X10*3/UL (ref 0–0.5)
IMM GRANULOCYTES NFR BLD AUTO: 1.2 % (ref 0–0.9)
LYMPHOCYTES # BLD AUTO: 2.6 X10*3/UL (ref 0.8–3)
LYMPHOCYTES NFR BLD AUTO: 33.7 %
MCH RBC QN AUTO: 31.9 PG (ref 26–34)
MCHC RBC AUTO-ENTMCNC: 32.8 G/DL (ref 32–36)
MCV RBC AUTO: 98 FL (ref 80–100)
MONOCYTES # BLD AUTO: 0.36 X10*3/UL (ref 0.05–0.8)
MONOCYTES NFR BLD AUTO: 4.7 %
NEUTROPHILS # BLD AUTO: 4.46 X10*3/UL (ref 1.6–5.5)
NEUTROPHILS NFR BLD AUTO: 57.7 %
NRBC BLD-RTO: 0 /100 WBCS (ref 0–0)
PLATELET # BLD AUTO: 276 X10*3/UL (ref 150–450)
PROT SERPL-MCNC: 6.6 G/DL (ref 6.4–8.2)
RBC # BLD AUTO: 4.32 X10*6/UL (ref 4–5.2)
RHEUMATOID FACT SER NEPH-ACNC: <10 IU/ML (ref 0–15)
RIBOSOMAL P AB SER-ACNC: <0.2 AI
WBC # BLD AUTO: 7.7 X10*3/UL (ref 4.4–11.3)

## 2024-08-27 PROCEDURE — 1125F AMNT PAIN NOTED PAIN PRSNT: CPT | Performed by: INTERNAL MEDICINE

## 2024-08-27 PROCEDURE — 84155 ASSAY OF PROTEIN SERUM: CPT

## 2024-08-27 PROCEDURE — 1157F ADVNC CARE PLAN IN RCRD: CPT | Performed by: INTERNAL MEDICINE

## 2024-08-27 PROCEDURE — 99204 OFFICE O/P NEW MOD 45 MIN: CPT | Performed by: INTERNAL MEDICINE

## 2024-08-27 PROCEDURE — 86038 ANTINUCLEAR ANTIBODIES: CPT

## 2024-08-27 PROCEDURE — 36415 COLL VENOUS BLD VENIPUNCTURE: CPT

## 2024-08-27 PROCEDURE — 1036F TOBACCO NON-USER: CPT | Performed by: INTERNAL MEDICINE

## 2024-08-27 PROCEDURE — 86225 DNA ANTIBODY NATIVE: CPT

## 2024-08-27 PROCEDURE — 84165 PROTEIN E-PHORESIS SERUM: CPT

## 2024-08-27 PROCEDURE — 86431 RHEUMATOID FACTOR QUANT: CPT

## 2024-08-27 PROCEDURE — 86235 NUCLEAR ANTIGEN ANTIBODY: CPT

## 2024-08-27 PROCEDURE — 85025 COMPLETE CBC W/AUTO DIFF WBC: CPT

## 2024-08-27 PROCEDURE — 1159F MED LIST DOCD IN RCRD: CPT | Performed by: INTERNAL MEDICINE

## 2024-08-27 PROCEDURE — 3078F DIAST BP <80 MM HG: CPT | Performed by: INTERNAL MEDICINE

## 2024-08-27 PROCEDURE — 3075F SYST BP GE 130 - 139MM HG: CPT | Performed by: INTERNAL MEDICINE

## 2024-08-27 PROCEDURE — 85652 RBC SED RATE AUTOMATED: CPT

## 2024-08-27 RX ORDER — FLUTICASONE PROPIONATE AND SALMETEROL 250; 50 UG/1; UG/1
1 POWDER RESPIRATORY (INHALATION)
Qty: 1 EACH | Refills: 1 | Status: SHIPPED | OUTPATIENT
Start: 2024-08-27

## 2024-08-27 RX ORDER — PILOCARPINE HYDROCHLORIDE 5 MG/1
5 TABLET, FILM COATED ORAL 3 TIMES DAILY
Qty: 90 TABLET | Refills: 2 | Status: SHIPPED | OUTPATIENT
Start: 2024-08-27

## 2024-08-27 ASSESSMENT — PAIN SCALES - GENERAL: PAINLEVEL: 2

## 2024-08-27 NOTE — PROGRESS NOTES
78 y.o.        female    self-referred    spouse    CHIEF COMPLAINT: Dry eyes and mouth    HPI: 78 yr old with dry eyes and mouth. She is concerned that she may have Sjogren's syndrome. Has no Raynaud's or numbness or tingling. No rash or mouth sores. Has no joint pain.    Patient Active Problem List   Diagnosis    Abnormal EKG    Acute cystitis without hematuria    Acute sinusitis    Allergic rhinitis    Bifascicular block    CAD (coronary artery disease)    Chest pressure    Cough    Persistent cough    Dizziness, nonspecific    Fatigue    Hypercholesterolemia    Hypertension    Hypothyroidism    Liver cyst    Muscle spasms of neck    Neck pain    Restless leg syndrome    Palpitations    Swelling of lymph node    Tachycardia    Tinnitus of both ears    UTI symptoms    Venous anomaly ([facility])    Pain of left upper extremity    Health care maintenance    Encounter for screening mammogram for malignant neoplasm of breast    Genital pruritus    Acute conjunctivitis of left eye    Chronic pain in left shoulder    Chronic left shoulder pain    Dyspepsia    Degenerative cervical spinal stenosis    Benign paroxysmal positional vertigo of left ear    Arthritis    GERD (gastroesophageal reflux disease)    Hearing loss    Osteopenia    Peripheral edema    Prediabetes    Pseudoaneurysm of carotid artery (CMS-HCC)    Seborrheic keratoses         Past Medical History:   Diagnosis Date    Bifascicular block 05/18/2023    CAD (coronary artery disease) 05/18/2023 9/5/2023 Elevated CAC 73.22 Agatston units (LM 66.1, LAD 7.12).  3/19/2019 CAC 50 (LM 48.2, LAD 1.5)   Prior CAC = 0    Chest pain, atypical 05/18/2023    Always at rest ... Pinching L upper chest ... 2x/wk lasting a minute    Disease of intestine, unspecified     Intestinal disorder    Disease of upper respiratory tract, unspecified     Upper respiratory disease    Hypercholesterolemia 05/18/2023    Baseline    Difficulty with statins     Hypertension 05/18/2023    Personal history of other diseases of the circulatory system     History of cardiac murmur    Personal history of other diseases of the circulatory system     History of heart block    Personal history of other diseases of the digestive system     History of hepatic disease    Personal history of other diseases of the musculoskeletal system and connective tissue     History of arthritis    Personal history of other endocrine, nutritional and metabolic disease     History of hyperlipidemia    Personal history of other endocrine, nutritional and metabolic disease     History of hyperthyroidism    Personal history of other specified conditions     History of headache    Personal history of pneumonia (recurrent)     History of pneumonia    Personal history of transient ischemic attack (TIA), and cerebral infarction without residual deficits     History of transient cerebral ischemia    Tachycardia 05/18/2023    Always above 100 bpm            Past Surgical History:   Procedure Laterality Date    COLONOSCOPY  11/20/2014    Colonoscopy (Fiberoptic)    OTHER SURGICAL HISTORY  11/20/2014    Bunion Correction By Lapidus Procedure       Allergies   Allergen Reactions    Amoxicillin-Pot Clavulanate Swelling     lip swelling    Penicillins Hives, Itching and Swelling     lip swelling    rash    Amoxicillin Hives     rash    Bee Pollen Other    House Dust Other       Medication Documentation Review Audit       Reviewed by Irma Crenshaw MA (Medical Assistant) on 08/27/24 at 0855      Medication Order Taking? Sig Documenting Provider Last Dose Status   aspirin 81 mg chewable tablet [id number] Yes Chew 1 tablet (81 mg) once daily. Rosendo Winn, DO Taking Active   atenolol (Tenormin) 25 mg tablet [id number] Yes take 1 tablet by mouth once daily Rosendo Winn, DO Taking Active   azelastine (Optivar) 0.05 % ophthalmic solution [id number] Yes Administer 1 drop into the left eye 2 times a day. José Luis OLIVA  ENRIQUE Laird Taking Active   benzonatate (Tessalon Perles) 100 mg capsule [id number] No Take 1 capsule (100 mg) by mouth 3 times a day as needed for cough.   Patient not taking: Reported on 2024    Jerry Cain PA-C Not Taking Active   Bifidobacterium infantis (Align) 4 mg capsule [id number] Yes  Historical Provider, MD Taking Active   DOCOSAHEXAENOIC ACID ORAL [id number] Yes omega 3-dha-epa-fish oil 967 mg-385 mg- 515 mg-1,290 mg capsule,delayed release(DR/EC) Take by mouth. 0 Active Historical Provider, MD Taking Active   evolocumab (Repatha SureClick) 140 mg/mL injection [id number] Yes Inject 1 mL (140 mg) under the skin every 14 (fourteen) days. Davonte Mays MD Taking Active   ezetimibe (Zetia) 10 mg tablet [id number]  Take 1 tablet (10 mg) by mouth once daily. Rosendo Winn,    24 2359   fluticasone (Flonase) 50 mcg/actuation nasal spray [id number] No Administer 1 spray into each nostril once daily. Shake gently. Before first use, prime pump. After use, clean tip and replace cap.   Patient not taking: Reported on 2024    Rosendo Winn DO Not Taking Active   magnesium oxide (Mag-Ox) 400 mg tablet [id number] Yes 1 tablet (400 mg) once daily. Historical Provider, MD Taking Active   omega 3-dha-epa-fish oil 967 mg-385 mg- 515 mg-1,290 mg capsule,delayed release(DR/EC) [id number] Yes Take by mouth. Historical Provider, MD Taking Active   Synthroid 75 mcg tablet [id number] Yes take 1 tablet by mouth once daily Rosendo Winn DO Taking Active                        No family history on file.       Social History     Tobacco Use    Smoking status: Never    Smokeless tobacco: Never   Substance Use Topics    Alcohol use: Never    Drug use: Never         Review of Systems    REVIEW OF SYSTEMS:  Constitutional: No fatigue  Skin:  No rash or psoriasis or photosensitvity  Head: No headache, oral ulcers or hair loss  Neck: No difficulty swallowing or choking  Eyes: Has dry eyes but no  iritis  Mouth: Has dry mouth but no oral ulcers  Pulmonary: No wheezing, pleurisy or SOB  Cardiovascular: No chest pain or palpitations  Gastrointestinal: No abdominal pain, nausea, heartburn, or blood in stool  Endocrine: No Raynaud's   Musculoskeletal: As H/P    All 10 review of systems negative      PHYSICAL EXAM:  Visit Vitals  /64   Pulse 67     General - NAD, sitting up in chair, well-groomed, pleasant, AAOx3  Head: Normocephalic, atraumatic  Eyes - PERRLA, EOMI. No conjunctiva injection.   Mouth/ENT - Moist oral and nasal mucosa. No facial rash. No enlarged parotid or submandibular gland. Decreased salivary pooling.  Cardiovascular - Normal S1, S2. Regular rate and rhythm. No murmurs or rubs.  Lungs - Symmetric chest expansion. Wheezing on auscultation  Skin - No rashes or ulcers. Skin warm and dry. No erythema on bilateral cheeks.  Abdomen - Soft, non-tender. No masses. Normal bowel sounds.  Extremities - No edema, cyanosis ,or clubbing  Neurological - Alert and oriented x 3,  grossly intact. No focal deficit.  Musculoskeletal -  EXAMJOINTDETAILED,  Shoulders: Full ROM, without pain, no swelling, warmth or tenderness.  Elbows: Full ROM, without pain, no swelling, warmth or tenderness.  Wrists: Full ROM, without pain, no swelling, warmth or tenderness.  MCP: No swelling, warmth or tenderness. Metacarpal squeeze negative  PIP: No swelling, warmth or tenderness.  DIP: No swelling, warmth or tenderness.  Hands : 5/5.           Assessment/Plan: 78 yr old with sicca symptoms. Possible Sjogren's. Labs ordered. Start on salagen for dry mouth.  Reactive airwway diease: Use Advair 1 puff BID.     Reviewed and approved by JAVED EVANS on 8/27/24 at 9:18 AM.        Javed Evans MD

## 2024-08-28 LAB — ANA SER QL HEP2 SUBST: NEGATIVE

## 2024-08-29 LAB
ALBUMIN: 4 G/DL (ref 3.4–5)
ALPHA 1 GLOBULIN: 0.3 G/DL (ref 0.2–0.6)
ALPHA 2 GLOBULIN: 0.7 G/DL (ref 0.4–1.1)
BETA GLOBULIN: 0.8 G/DL (ref 0.5–1.2)
GAMMA GLOBULIN: 0.8 G/DL (ref 0.5–1.4)
PATH REVIEW-SERUM PROTEIN ELECTROPHORESIS: NORMAL
PROTEIN ELECTROPHORESIS COMMENT: NORMAL

## 2024-09-05 PROBLEM — R63.4 WEIGHT LOSS: Status: ACTIVE | Noted: 2024-08-19

## 2024-09-05 PROBLEM — R10.13 POSTPRANDIAL EPIGASTRIC PAIN: Status: ACTIVE | Noted: 2024-08-19

## 2024-09-05 PROBLEM — K59.00 CONSTIPATION: Status: ACTIVE | Noted: 2024-08-19

## 2024-09-05 PROBLEM — K63.5 COLON POLYP: Status: ACTIVE | Noted: 2024-06-27

## 2024-09-19 ENCOUNTER — LAB (OUTPATIENT)
Dept: LAB | Facility: LAB | Age: 79
End: 2024-09-19
Payer: MEDICARE

## 2024-09-19 DIAGNOSIS — I25.10 CORONARY ARTERY DISEASE INVOLVING NATIVE CORONARY ARTERY OF NATIVE HEART WITHOUT ANGINA PECTORIS: Chronic | ICD-10-CM

## 2024-09-19 DIAGNOSIS — R07.89 CHEST PRESSURE: ICD-10-CM

## 2024-09-19 LAB
ALBUMIN SERPL BCP-MCNC: 4.2 G/DL (ref 3.4–5)
ALP SERPL-CCNC: 72 U/L (ref 33–136)
ALT SERPL W P-5'-P-CCNC: 19 U/L (ref 7–45)
ANION GAP SERPL CALC-SCNC: 10 MMOL/L (ref 10–20)
AST SERPL W P-5'-P-CCNC: 26 U/L (ref 9–39)
BILIRUB SERPL-MCNC: 0.5 MG/DL (ref 0–1.2)
BUN SERPL-MCNC: 16 MG/DL (ref 6–23)
CALCIUM SERPL-MCNC: 9.4 MG/DL (ref 8.6–10.3)
CHLORIDE SERPL-SCNC: 104 MMOL/L (ref 98–107)
CHOLEST SERPL-MCNC: 134 MG/DL (ref 0–199)
CHOLESTEROL/HDL RATIO: 2
CO2 SERPL-SCNC: 30 MMOL/L (ref 21–32)
CREAT SERPL-MCNC: 0.98 MG/DL (ref 0.5–1.05)
EGFRCR SERPLBLD CKD-EPI 2021: 60 ML/MIN/1.73M*2
GLUCOSE SERPL-MCNC: 96 MG/DL (ref 74–99)
HDLC SERPL-MCNC: 68.2 MG/DL
LDLC SERPL CALC-MCNC: 47 MG/DL
NON HDL CHOLESTEROL: 66 MG/DL (ref 0–149)
POTASSIUM SERPL-SCNC: 4.1 MMOL/L (ref 3.5–5.3)
PROT SERPL-MCNC: 6.4 G/DL (ref 6.4–8.2)
SODIUM SERPL-SCNC: 140 MMOL/L (ref 136–145)
TRIGL SERPL-MCNC: 96 MG/DL (ref 0–149)
VLDL: 19 MG/DL (ref 0–40)

## 2024-09-19 PROCEDURE — 80053 COMPREHEN METABOLIC PANEL: CPT

## 2024-09-19 PROCEDURE — 36415 COLL VENOUS BLD VENIPUNCTURE: CPT

## 2024-09-19 PROCEDURE — 80061 LIPID PANEL: CPT

## 2024-09-20 ENCOUNTER — APPOINTMENT (OUTPATIENT)
Dept: PRIMARY CARE | Facility: CLINIC | Age: 79
End: 2024-09-20
Payer: MEDICARE

## 2024-09-20 VITALS
DIASTOLIC BLOOD PRESSURE: 77 MMHG | HEART RATE: 82 BPM | SYSTOLIC BLOOD PRESSURE: 123 MMHG | BODY MASS INDEX: 27.88 KG/M2 | WEIGHT: 142 LBS | HEIGHT: 60 IN

## 2024-09-20 DIAGNOSIS — L98.9 SKIN LESION: Primary | ICD-10-CM

## 2024-09-20 DIAGNOSIS — R42 VERTIGO: ICD-10-CM

## 2024-09-20 DIAGNOSIS — E78.5 HYPERLIPIDEMIA, UNSPECIFIED HYPERLIPIDEMIA TYPE: ICD-10-CM

## 2024-09-20 PROCEDURE — 1036F TOBACCO NON-USER: CPT | Performed by: STUDENT IN AN ORGANIZED HEALTH CARE EDUCATION/TRAINING PROGRAM

## 2024-09-20 PROCEDURE — 99214 OFFICE O/P EST MOD 30 MIN: CPT | Performed by: STUDENT IN AN ORGANIZED HEALTH CARE EDUCATION/TRAINING PROGRAM

## 2024-09-20 PROCEDURE — 3074F SYST BP LT 130 MM HG: CPT | Performed by: STUDENT IN AN ORGANIZED HEALTH CARE EDUCATION/TRAINING PROGRAM

## 2024-09-20 PROCEDURE — 1160F RVW MEDS BY RX/DR IN RCRD: CPT | Performed by: STUDENT IN AN ORGANIZED HEALTH CARE EDUCATION/TRAINING PROGRAM

## 2024-09-20 PROCEDURE — 1157F ADVNC CARE PLAN IN RCRD: CPT | Performed by: STUDENT IN AN ORGANIZED HEALTH CARE EDUCATION/TRAINING PROGRAM

## 2024-09-20 PROCEDURE — 3078F DIAST BP <80 MM HG: CPT | Performed by: STUDENT IN AN ORGANIZED HEALTH CARE EDUCATION/TRAINING PROGRAM

## 2024-09-20 PROCEDURE — G2211 COMPLEX E/M VISIT ADD ON: HCPCS | Performed by: STUDENT IN AN ORGANIZED HEALTH CARE EDUCATION/TRAINING PROGRAM

## 2024-09-20 PROCEDURE — 1159F MED LIST DOCD IN RCRD: CPT | Performed by: STUDENT IN AN ORGANIZED HEALTH CARE EDUCATION/TRAINING PROGRAM

## 2024-09-20 RX ORDER — MECLIZINE HYDROCHLORIDE 25 MG/1
25 TABLET ORAL 3 TIMES DAILY PRN
Qty: 30 TABLET | Refills: 1 | Status: SHIPPED | OUTPATIENT
Start: 2024-09-20 | End: 2024-12-19

## 2024-09-20 RX ORDER — EZETIMIBE 10 MG/1
10 TABLET ORAL DAILY
Qty: 90 TABLET | Refills: 1 | Status: SHIPPED | OUTPATIENT
Start: 2024-09-20 | End: 2025-03-19

## 2024-09-20 ASSESSMENT — ENCOUNTER SYMPTOMS
ALLERGIC/IMMUNOLOGIC NEGATIVE: 1
DIZZINESS: 1
EYES NEGATIVE: 1
RESPIRATORY NEGATIVE: 1
HEMATOLOGIC/LYMPHATIC NEGATIVE: 1
MUSCULOSKELETAL NEGATIVE: 1
ENDOCRINE NEGATIVE: 1
PSYCHIATRIC NEGATIVE: 1
CONSTITUTIONAL NEGATIVE: 1
CARDIOVASCULAR NEGATIVE: 1
GASTROINTESTINAL NEGATIVE: 1

## 2024-09-20 NOTE — PROGRESS NOTES
Subjective   Patient ID: María Clifford is a 78 y.o. female who presents for Follow-up (6 month follow up).    Patient presented to the office today with a complaint of dizziness.  She reported reduced water intake during the day and that she drank too much water to compensate for this when she started to feel dizzy.  She mentioned that there was no specific factors that helped improve or caused worsening of the symptoms and that it resolved on its own.  She had similar episodes before during Colonoscopy preparation.  Currently she denied having any dizziness, lightheadedness, shortness of breath, chest pain, abdominal pain, recent change in bowel habits, or urinary symptoms.         Review of Systems   Constitutional: Negative.    HENT: Negative.     Eyes: Negative.    Respiratory: Negative.     Cardiovascular: Negative.    Gastrointestinal: Negative.    Endocrine: Negative.    Genitourinary: Negative.    Musculoskeletal: Negative.    Skin: Negative.    Allergic/Immunologic: Negative.    Neurological:  Positive for dizziness.   Hematological: Negative.    Psychiatric/Behavioral: Negative.         Objective   /77   Pulse 82   Ht 1.524 m (5')   Wt 64.4 kg (142 lb)   BMI 27.73 kg/m²     Physical Exam  Constitutional:       Appearance: Normal appearance. She is normal weight.   HENT:      Head: Normocephalic and atraumatic.      Right Ear: External ear normal.      Left Ear: External ear normal.      Nose: Nose normal.      Mouth/Throat:      Mouth: Mucous membranes are moist.      Pharynx: Oropharynx is clear.   Eyes:      Extraocular Movements: Extraocular movements intact.      Conjunctiva/sclera: Conjunctivae normal.      Pupils: Pupils are equal, round, and reactive to light.   Cardiovascular:      Rate and Rhythm: Normal rate and regular rhythm.      Pulses: Normal pulses.      Heart sounds: Normal heart sounds.   Pulmonary:      Effort: Pulmonary effort is normal.      Breath sounds: Normal breath  sounds.   Abdominal:      General: Abdomen is flat. Bowel sounds are normal.      Palpations: Abdomen is soft.   Musculoskeletal:         General: Normal range of motion.      Cervical back: Normal range of motion and neck supple.   Skin:     General: Skin is warm and dry.   Neurological:      General: No focal deficit present.      Mental Status: She is alert and oriented to person, place, and time.   Psychiatric:         Mood and Affect: Mood normal.         Behavior: Behavior normal.         Assessment/Plan     #HTN  Stable well controlled, follows with cardiology    #HLD  She is unable to tolerate statins is on Zetia however cholesterol levels still are elevated.   -Patient reported significant side effects from Pcks9 inhibitor.  Thus, she stopped taking the medication and will follow-up with her cardiologist in this regard.     #fatty liver  Continue diet and exercise, as avoidance of statins secondary to this advised to discuss with cardiology    #hypothyroid  Check Tsh     #osteopenia  Dexa scan, needs DEXA scan next year 2025  -vitamin D supplements      #Glaucoma  Would prefer alternative eyedrops, advised to discuss with ophthalmology could consider tacrolimus    #Cataract  -Patient is scheduled for cataract surgery.     #health maintaince  Previous records reviewed  UTD on age appropriate prevention  Depression screen neg  Medicare wellness at next visit       RTC in 6-8 month       Patient seen in conjunction with resident physician, we will transition to Zetia per patient request, encouraged discussing with cardiology.  In addition, advise hydration, meclizine as needed consider ENT follow-up as well as imaging.  Otherwise no additional issues at this time. Lab work and Medicare wellness at next visit

## 2024-09-24 ENCOUNTER — SPECIALTY PHARMACY (OUTPATIENT)
Dept: PHARMACY | Facility: CLINIC | Age: 79
End: 2024-09-24

## 2024-09-24 DIAGNOSIS — I25.10 CORONARY ARTERY DISEASE INVOLVING NATIVE CORONARY ARTERY OF NATIVE HEART WITHOUT ANGINA PECTORIS: Chronic | ICD-10-CM

## 2024-09-24 DIAGNOSIS — R07.89 CHEST PRESSURE: ICD-10-CM

## 2024-09-24 RX ORDER — EVOLOCUMAB 140 MG/ML
140 INJECTION, SOLUTION SUBCUTANEOUS
Qty: 6 ML | Refills: 3 | Status: SHIPPED | OUTPATIENT
Start: 2024-09-24 | End: 2025-09-24

## 2024-09-25 ENCOUNTER — OFFICE VISIT (OUTPATIENT)
Dept: DERMATOLOGY | Facility: CLINIC | Age: 79
End: 2024-09-25
Payer: MEDICARE

## 2024-09-25 DIAGNOSIS — L57.8 PHOTOAGING OF SKIN: Primary | ICD-10-CM

## 2024-09-25 DIAGNOSIS — D18.01 HEMANGIOMA OF SKIN: ICD-10-CM

## 2024-09-25 DIAGNOSIS — L82.1 SEBORRHEIC KERATOSIS: ICD-10-CM

## 2024-09-25 DIAGNOSIS — Z12.83 ENCOUNTER FOR SCREENING FOR MALIGNANT NEOPLASM OF SKIN: ICD-10-CM

## 2024-09-25 PROCEDURE — 1159F MED LIST DOCD IN RCRD: CPT | Performed by: DERMATOLOGY

## 2024-09-25 PROCEDURE — 1157F ADVNC CARE PLAN IN RCRD: CPT | Performed by: DERMATOLOGY

## 2024-09-25 PROCEDURE — 1036F TOBACCO NON-USER: CPT | Performed by: DERMATOLOGY

## 2024-09-25 PROCEDURE — 99203 OFFICE O/P NEW LOW 30 MIN: CPT | Performed by: DERMATOLOGY

## 2024-09-25 ASSESSMENT — DERMATOLOGY PATIENT ASSESSMENT
DO YOU USE A TANNING BED: NO
ARE YOU AN ORGAN TRANSPLANT RECIPIENT: NO
DO YOU USE SUNSCREEN: OCCASIONALLY
ARE YOU ON BIRTH CONTROL: NO
HAVE YOU HAD OR DO YOU HAVE A STAPH INFECTION: NO
ARE YOU TRYING TO GET PREGNANT: NO
HAVE YOU HAD OR DO YOU HAVE VASCULAR DISEASE: NO
DO YOU HAVE IRREGULAR MENSTRUAL CYCLES: NO
DO YOU HAVE ANY NEW OR CHANGING LESIONS: YES

## 2024-09-25 ASSESSMENT — PATIENT GLOBAL ASSESSMENT (PGA): PATIENT GLOBAL ASSESSMENT: PATIENT GLOBAL ASSESSMENT:  1 - CLEAR

## 2024-09-25 ASSESSMENT — DERMATOLOGY QUALITY OF LIFE (QOL) ASSESSMENT
ARE THERE EXCLUSIONS OR EXCEPTIONS FOR THE QUALITY OF LIFE ASSESSMENT: NO
RATE HOW BOTHERED YOU ARE BY SYMPTOMS OF YOUR SKIN PROBLEM (EG, ITCHING, STINGING BURNING, HURTING OR SKIN IRRITATION): 0 - NEVER BOTHERED
WHAT SINGLE SKIN CONDITION LISTED BELOW IS THE PATIENT ANSWERING THE QUALITY-OF-LIFE ASSESSMENT QUESTIONS ABOUT: NONE OF THE ABOVE
RATE HOW BOTHERED YOU ARE BY EFFECTS OF YOUR SKIN PROBLEMS ON YOUR ACTIVITIES (EG, GOING OUT, ACCOMPLISHING WHAT YOU WANT, WORK ACTIVITIES OR YOUR RELATIONSHIPS WITH OTHERS): 0 - NEVER BOTHERED
RATE HOW EMOTIONALLY BOTHERED YOU ARE BY YOUR SKIN PROBLEM (FOR EXAMPLE, WORRY, EMBARRASSMENT, FRUSTRATION): 0 - NEVER BOTHERED

## 2024-09-25 ASSESSMENT — ITCH NUMERIC RATING SCALE: HOW SEVERE IS YOUR ITCHING?: 0

## 2024-09-25 NOTE — LETTER
September 25, 2024     Rosendo Winn DO  [address]  [address]  [facility] [address]    Patient: María Clifford   YOB: 1945   Date of Visit: 9/25/2024       Dear Dr. Rosendo Winn DO:    Thank you for referring María Clifford to me for evaluation. Below are my notes for this consultation.  If you have questions, please do not hesitate to call me. I look forward to following your patient along with you.       Sincerely,     Sarah Taylor DO      CC: No Recipients  ______________________________________________________________________________________    Subjective    María Clifford is a 78 y.o. female who presents for the following: Skin Check (Pt here today for FBSE. No hx of skin cancer. ).     Review of Systems:  No other skin or systemic complaints other than what is documented elsewhere in the note.    The following portions of the chart were reviewed this encounter and updated as appropriate:         Skin Cancer History  No skin cancer on file.      Specialty Problems          Dermatology Problems    Seborrheic keratoses    Genital pruritus        Objective  Well appearing patient in no apparent distress; mood and affect are within normal limits.    A full examination was performed including scalp, head, eyes, ears, nose, lips, neck, chest, axillae, abdomen, back, buttocks, bilateral upper extremities, bilateral lower extremities, hands, feet, fingers, toes, fingernails, and toenails. All findings within normal limits unless otherwise noted below.    Assessment/Plan  1. Photoaging of skin  Mottled pigmentation with telangiectasias and brown reticular macules in sun exposed areas of the body.    The risk of chronic, cumulative sun damage and risk of development of skin cancer was reviewed today.   The importance of sun protection was reviewed: including the use of a broad spectrum sunscreen of at least SPF 30 that protects against both UVA/UVB rays, with ingredients such as Zinc  oxide or titanium dioxide, wearing sun protective clothing and sun avoidance. We reviewed the warning signs of non-melanoma skin cancer and ABCDEs of melanoma  Please follow up should you notice any new or changing pre-existing skin lesion.    2. Encounter for screening for malignant neoplasm of skin  No suspicious lesions noted on examination today    The risk of chronic, cumulative sun damage and risk of development of skin cancer was reviewed today.   The importance of sun protection was reviewed: including the use of a broad spectrum sunscreen of at least SPF 30 that protects against both UVA/UVB rays, with ingredients such as Zinc oxide or titanium dioxide, wearing sun protective clothing and sun avoidance. We reviewed the warning signs of non-melanoma skin cancer and ABCDEs of melanoma  Please follow up should you notice any new or changing pre-existing skin lesion.    3. Seborrheic keratosis (7)  Generalized, Left Forehead (2), Left Temple, Right Dorsal Hand, Right Forehead, Right Temple  Pink stuck on appearing papule - right dorsal hand  Multiple brown stuck on appearing papules and plaques    The benign nature of these skin lesions reviewed, reassure provided and no further treatment needed at this time.   These lesions can be removed, if symptomatic (itching, bleeding, rubbing on clothing, painful), otherwise removal is considered cosmetic.     LN2 as courtesy to the right dorsal hand. The risks and benefits of LN2 were reviewed including incomplete removal, crusting, blister hypo and/or hyperpigmentation, scarring and recurrence.      Destr of lesion - Right Dorsal Hand  Complexity: simple    Destruction method: cryotherapy    Informed consent: discussed and consent obtained    Lesion destroyed using liquid nitrogen: Yes    Region frozen until ice ball extended beyond lesion: Yes    Cryotherapy cycles:  3  Outcome: patient tolerated procedure well with no complications    Post-procedure details: wound  care instructions given      4. Hemangioma of skin  Cherry red papules    The benign nature of these skin lesions were reviewed, no treatment is necessary.   Please follow up for any new or pre-existing lesion that is changing in size, shape, color, becomes painful, tender, itches or bleed.      Follow up as needed

## 2024-09-25 NOTE — PROGRESS NOTES
Subjective     María Clifford is a 78 y.o. female who presents for the following: Skin Check (Pt here today for FBSE. No hx of skin cancer. ).     Review of Systems:  No other skin or systemic complaints other than what is documented elsewhere in the note.    The following portions of the chart were reviewed this encounter and updated as appropriate:         Skin Cancer History  No skin cancer on file.      Specialty Problems          Dermatology Problems    Seborrheic keratoses    Genital pruritus        Objective   Well appearing patient in no apparent distress; mood and affect are within normal limits.    A full examination was performed including scalp, head, eyes, ears, nose, lips, neck, chest, axillae, abdomen, back, buttocks, bilateral upper extremities, bilateral lower extremities, hands, feet, fingers, toes, fingernails, and toenails. All findings within normal limits unless otherwise noted below.    Assessment/Plan   1. Photoaging of skin  Mottled pigmentation with telangiectasias and brown reticular macules in sun exposed areas of the body.    The risk of chronic, cumulative sun damage and risk of development of skin cancer was reviewed today.   The importance of sun protection was reviewed: including the use of a broad spectrum sunscreen of at least SPF 30 that protects against both UVA/UVB rays, with ingredients such as Zinc oxide or titanium dioxide, wearing sun protective clothing and sun avoidance. We reviewed the warning signs of non-melanoma skin cancer and ABCDEs of melanoma  Please follow up should you notice any new or changing pre-existing skin lesion.    2. Encounter for screening for malignant neoplasm of skin  No suspicious lesions noted on examination today    The risk of chronic, cumulative sun damage and risk of development of skin cancer was reviewed today.   The importance of sun protection was reviewed: including the use of a broad spectrum sunscreen of at least SPF 30 that protects  against both UVA/UVB rays, with ingredients such as Zinc oxide or titanium dioxide, wearing sun protective clothing and sun avoidance. We reviewed the warning signs of non-melanoma skin cancer and ABCDEs of melanoma  Please follow up should you notice any new or changing pre-existing skin lesion.    3. Seborrheic keratosis (7)  Generalized, Left Forehead (2), Left Temple, Right Dorsal Hand, Right Forehead, Right Temple  Pink stuck on appearing papule - right dorsal hand  Multiple brown stuck on appearing papules and plaques    The benign nature of these skin lesions reviewed, reassure provided and no further treatment needed at this time.   These lesions can be removed, if symptomatic (itching, bleeding, rubbing on clothing, painful), otherwise removal is considered cosmetic.     LN2 as courtesy to the right dorsal hand. The risks and benefits of LN2 were reviewed including incomplete removal, crusting, blister hypo and/or hyperpigmentation, scarring and recurrence.      Destr of lesion - Right Dorsal Hand  Complexity: simple    Destruction method: cryotherapy    Informed consent: discussed and consent obtained    Lesion destroyed using liquid nitrogen: Yes    Region frozen until ice ball extended beyond lesion: Yes    Cryotherapy cycles:  3  Outcome: patient tolerated procedure well with no complications    Post-procedure details: wound care instructions given      4. Hemangioma of skin  Cherry red papules    The benign nature of these skin lesions were reviewed, no treatment is necessary.   Please follow up for any new or pre-existing lesion that is changing in size, shape, color, becomes painful, tender, itches or bleed.      Follow up as needed     No

## 2024-09-27 PROBLEM — M35.01 SJOGREN SYNDROME WITH KERATOCONJUNCTIVITIS (MULTI): Status: ACTIVE | Noted: 2024-09-27

## 2024-09-29 PROBLEM — M62.838 MUSCLE SPASMS OF NECK: Status: RESOLVED | Noted: 2023-05-18 | Resolved: 2024-09-29

## 2024-09-29 PROBLEM — M85.80 OSTEOPENIA: Status: RESOLVED | Noted: 2019-11-26 | Resolved: 2024-09-29

## 2024-09-29 PROBLEM — K63.5 COLON POLYP: Status: RESOLVED | Noted: 2024-06-27 | Resolved: 2024-09-29

## 2024-09-29 PROBLEM — R60.0 PERIPHERAL EDEMA: Status: RESOLVED | Noted: 2020-06-22 | Resolved: 2024-09-29

## 2024-09-29 PROBLEM — M25.512 CHRONIC PAIN IN LEFT SHOULDER: Status: RESOLVED | Noted: 2024-04-10 | Resolved: 2024-09-29

## 2024-09-29 PROBLEM — K59.00 CONSTIPATION: Status: RESOLVED | Noted: 2024-08-19 | Resolved: 2024-09-29

## 2024-09-29 PROBLEM — R94.31 ABNORMAL EKG: Status: RESOLVED | Noted: 2023-05-18 | Resolved: 2024-09-29

## 2024-09-29 PROBLEM — J01.90 ACUTE SINUSITIS: Status: RESOLVED | Noted: 2023-05-18 | Resolved: 2024-09-29

## 2024-09-29 PROBLEM — K76.89 LIVER CYST: Status: RESOLVED | Noted: 2023-05-18 | Resolved: 2024-09-29

## 2024-09-29 PROBLEM — R39.9 UTI SYMPTOMS: Status: RESOLVED | Noted: 2023-05-18 | Resolved: 2024-09-29

## 2024-09-29 PROBLEM — M79.602 PAIN OF LEFT UPPER EXTREMITY: Status: RESOLVED | Noted: 2023-05-18 | Resolved: 2024-09-29

## 2024-09-29 PROBLEM — H91.90 HEARING LOSS: Status: RESOLVED | Noted: 2019-11-26 | Resolved: 2024-09-29

## 2024-09-29 PROBLEM — R05.9 COUGH: Status: RESOLVED | Noted: 2023-05-18 | Resolved: 2024-09-29

## 2024-09-29 PROBLEM — R05.3 PERSISTENT COUGH: Status: RESOLVED | Noted: 2023-05-18 | Resolved: 2024-09-29

## 2024-09-29 PROBLEM — R42 DIZZINESS, NONSPECIFIC: Status: RESOLVED | Noted: 2023-05-18 | Resolved: 2024-09-29

## 2024-09-29 PROBLEM — H81.12 BENIGN PAROXYSMAL POSITIONAL VERTIGO OF LEFT EAR: Status: RESOLVED | Noted: 2022-05-23 | Resolved: 2024-09-29

## 2024-09-29 PROBLEM — R53.83 FATIGUE: Status: RESOLVED | Noted: 2023-05-18 | Resolved: 2024-09-29

## 2024-09-29 PROBLEM — R10.13 DYSPEPSIA: Status: RESOLVED | Noted: 2022-12-14 | Resolved: 2024-09-29

## 2024-09-29 PROBLEM — M54.2 NECK PAIN: Status: RESOLVED | Noted: 2023-05-18 | Resolved: 2024-09-29

## 2024-09-29 PROBLEM — H93.13 TINNITUS OF BOTH EARS: Status: RESOLVED | Noted: 2023-05-18 | Resolved: 2024-09-29

## 2024-09-29 PROBLEM — Z12.31 ENCOUNTER FOR SCREENING MAMMOGRAM FOR MALIGNANT NEOPLASM OF BREAST: Status: RESOLVED | Noted: 2024-03-21 | Resolved: 2024-09-29

## 2024-09-29 PROBLEM — G89.29 CHRONIC LEFT SHOULDER PAIN: Status: RESOLVED | Noted: 2024-04-18 | Resolved: 2024-09-29

## 2024-09-29 PROBLEM — R10.13 POSTPRANDIAL EPIGASTRIC PAIN: Status: RESOLVED | Noted: 2024-08-19 | Resolved: 2024-09-29

## 2024-09-29 PROBLEM — J30.9 ALLERGIC RHINITIS: Status: RESOLVED | Noted: 2023-05-18 | Resolved: 2024-09-29

## 2024-09-29 PROBLEM — M25.512 CHRONIC LEFT SHOULDER PAIN: Status: RESOLVED | Noted: 2024-04-18 | Resolved: 2024-09-29

## 2024-09-29 PROBLEM — G89.29 CHRONIC PAIN IN LEFT SHOULDER: Status: RESOLVED | Noted: 2024-04-10 | Resolved: 2024-09-29

## 2024-09-29 PROBLEM — M19.90 ARTHRITIS: Status: RESOLVED | Noted: 2024-05-23 | Resolved: 2024-09-29

## 2024-09-29 PROBLEM — Z00.00 HEALTH CARE MAINTENANCE: Status: RESOLVED | Noted: 2023-05-18 | Resolved: 2024-09-29

## 2024-09-29 PROBLEM — N30.00 ACUTE CYSTITIS WITHOUT HEMATURIA: Status: RESOLVED | Noted: 2023-05-18 | Resolved: 2024-09-29

## 2024-09-29 PROBLEM — R59.9 SWELLING OF LYMPH NODE: Status: RESOLVED | Noted: 2023-05-18 | Resolved: 2024-09-29

## 2024-09-29 PROBLEM — H10.32 ACUTE CONJUNCTIVITIS OF LEFT EYE: Status: RESOLVED | Noted: 2024-03-30 | Resolved: 2024-09-29

## 2024-09-29 PROBLEM — L82.1 SEBORRHEIC KERATOSES: Status: RESOLVED | Noted: 2020-08-13 | Resolved: 2024-09-29

## 2024-09-29 NOTE — PROGRESS NOTES
Referred by No ref. provider found    HPI     I am seeing María in follow-up.  Overall she is feeling okay.  She had developed a very bad cold after starting Repatha after the fourth dose.  She stopped the Repatha.  The call lasted for a month.  She is been fatigued since the cold.  No chest pain or pressure.  She does have occasional vertigo.    Past Medical History:  Problem List Items Addressed This Visit    None     Past Medical History:   Diagnosis Date    Bifascicular block 05/18/2023    CAD (coronary artery disease) 05/18/2023 9/5/2023 Elevated CAC 73.22 Agatston units (LM 66.1, LAD 7.12).  3/19/2019 CAC 50 (LM 48.2, LAD 1.5)   Prior CAC = 0    Chest pain, atypical 05/18/2023    Always at rest ... Pinching L upper chest ... 2x/wk lasting a minute    Disease of intestine, unspecified     Intestinal disorder    Disease of upper respiratory tract, unspecified     Upper respiratory disease    Hypercholesterolemia 05/18/2023    Baseline    Difficulty with statins    Hypertension 05/18/2023    Personal history of other diseases of the circulatory system     History of cardiac murmur    Personal history of other diseases of the circulatory system     History of heart block    Personal history of other diseases of the digestive system     History of hepatic disease    Personal history of other diseases of the musculoskeletal system and connective tissue     History of arthritis    Personal history of other endocrine, nutritional and metabolic disease     History of hyperlipidemia    Personal history of other endocrine, nutritional and metabolic disease     History of hyperthyroidism    Personal history of other specified conditions     History of headache    Personal history of pneumonia (recurrent)     History of pneumonia    Personal history of transient ischemic attack (TIA), and cerebral infarction without residual deficits     History of transient cerebral ischemia    Tachycardia 05/18/2023    Always  above 100 bpm         Past Surgical History:  She has a past surgical history that includes Other surgical history (11/20/2014) and Colonoscopy (11/20/2014).      Social History:  She reports that she has never smoked. She has never used smokeless tobacco. She reports that she does not drink alcohol and does not use drugs.    Family History:  No family history on file.     Allergies:  Amoxicillin-pot clavulanate, Penicillins, Amoxicillin, Bee pollen, and House dust    Outpatient Medications:  Current Outpatient Medications   Medication Instructions    atenolol (TENORMIN) 25 mg, oral, Daily    azelastine (Optivar) 0.05 % ophthalmic solution 1 drop, Left Eye, 2 times daily    Bifidobacterium infantis (Align) 4 mg capsule     DOCOSAHEXAENOIC ACID ORAL omega 3-dha-epa-fish oil 967 mg-385 mg- 515 mg-1,290 mg capsule,delayed release(DR/EC) Take by mouth. 0 Active    ezetimibe (ZETIA) 10 mg, oral, Daily    magnesium oxide (MAG-OX) 400 mg, Daily    meclizine (ANTIVERT) 25 mg, oral, 3 times daily PRN    omega 3-dha-epa-fish oil 967 mg-385 mg- 515 mg-1,290 mg capsule,delayed release(DR/EC) oral    Repatha SureClick 140 mg, subcutaneous, Every 14 days    Synthroid 75 mcg, oral, Daily     Last Recorded Vitals:  There were no vitals filed for this visit.    Physical Exam    Physical  Patient is alert and oriented x3.  HEENT is unremarkable mucous members are moist  Neck no JVP no bruits upstrokes are full no thyromegaly  Lungs are clear bilaterally.  No wheezing crackles or rales  Heart regular rhythm normal S1-S2 there is no S3 no murmurs are heard.  Abdomen is soft bs are positive nontender nondistended no organomegaly no pulsatile masses  Extremities have no edema.  Distal pulses present palpable.  Neuro is grossly nonfocal  Skin has no rashes     Last Labs:  CBC -  Lab Results   Component Value Date    WBC 7.7 08/27/2024    HGB 13.8 08/27/2024    HCT 42.1 08/27/2024    MCV 98 08/27/2024     08/27/2024     CMP -  Lab  Results   Component Value Date    CALCIUM 9.4 09/19/2024    PROT 6.4 09/19/2024    ALBUMIN 4.2 09/19/2024    AST 26 09/19/2024    ALT 19 09/19/2024    ALKPHOS 72 09/19/2024    BILITOT 0.5 09/19/2024     LIPID PANEL -   Lab Results   Component Value Date    CHOL 134 09/19/2024    HDL 68.2 09/19/2024    CHHDL 2.0 09/19/2024    VLDL 19 09/19/2024    TRIG 96 09/19/2024    NHDL 66 09/19/2024     RENAL FUNCTION PANEL -   Lab Results   Component Value Date    K 4.1 09/19/2024     Lab Results   Component Value Date    HGBA1C 5.8 (A) 05/18/2023    HGBA1C 5.7 (H) 06/27/2022     Procedure    7/15/2024 negative EKG excellent capacity no symptoms normal perfusion EF 75%    CAC 9/5/2023 73 units    NUCLEAR STRESS (METS 10.10) Normal 70% EF.     ECHO (4/5/19) Abi LVF 60-65% EF. Pseudonormal pattern of LV diastolic filling.     HOLTER (3/20/19) Sinus (average 84 bpm)  bpm.      CAC (3/19/19) score = 49.7 (score was 0 on CAC 11/8/11) (57%). Bibasilar scarring/atel and some mild bronchi at lung base and middle lobe. Mod-LG hiatal hernia increased since presious. Mildly prom distal paraesophageal node tp to 5mm similar to prior. Lobulated hypodense mass in left hapatic lobe 5cm prob a cyst although increased in sz compared to previous.      CAC (11/8/11) score = 0         Assessment/Plan   1.  Bifascicular block.  Repeat EKG today.  No dizziness.  2.  CAD.  2011 calcium score of 0.  2019 the calcium score is 50 units.  Now up to 73.  She has vague symptoms of chest discomfort 3 times a year.  An EKG was done today.   Continue the baby aspirin, atenolol, and Zetia.  Unable to tolerate statins.  I advise she start a PCSK9 inhibitor.  Exercise nuclear stress test July 2024 excellent functional capacity no symptoms no EKG changes normal perfusion ejection fraction 72%  3.  Hyperlipidemia.  Baseline .  Intolerant to statins in the past.  5/18/2023  HDL 65 triglycerides 344.  Recently her LDL went back up to 183.  She  is agreeable to trying Repatha.  9/19/2024 LDL 47 HDL 68 triglycerides 96 LFTs normal.  However she stopped after the fourth dose because of getting a viral infection which she needed to the Repatha.  She has not been taking Zetia for a period of time.  She will have blood work followed up with her PCP.  I have encouraged her to get back on the Repatha.  Her target LDL is less than 70.      RTC 1 year    Davonte Mays MD     Instructions and follow up

## 2024-09-30 ENCOUNTER — APPOINTMENT (OUTPATIENT)
Dept: CARDIOLOGY | Facility: CLINIC | Age: 79
End: 2024-09-30
Payer: MEDICARE

## 2024-09-30 VITALS
WEIGHT: 144 LBS | HEART RATE: 73 BPM | BODY MASS INDEX: 28.12 KG/M2 | SYSTOLIC BLOOD PRESSURE: 122 MMHG | OXYGEN SATURATION: 94 % | DIASTOLIC BLOOD PRESSURE: 82 MMHG

## 2024-09-30 DIAGNOSIS — I45.2 BIFASCICULAR BLOCK: Primary | Chronic | ICD-10-CM

## 2024-09-30 DIAGNOSIS — I25.10 CORONARY ARTERY DISEASE INVOLVING NATIVE CORONARY ARTERY OF NATIVE HEART WITHOUT ANGINA PECTORIS: Chronic | ICD-10-CM

## 2024-09-30 DIAGNOSIS — I10 PRIMARY HYPERTENSION: Chronic | ICD-10-CM

## 2024-09-30 DIAGNOSIS — E78.00 HYPERCHOLESTEROLEMIA: Chronic | ICD-10-CM

## 2024-09-30 DIAGNOSIS — R00.2 PALPITATIONS: ICD-10-CM

## 2024-09-30 PROCEDURE — 99213 OFFICE O/P EST LOW 20 MIN: CPT | Performed by: INTERNAL MEDICINE

## 2024-09-30 PROCEDURE — 1036F TOBACCO NON-USER: CPT | Performed by: INTERNAL MEDICINE

## 2024-09-30 PROCEDURE — 3079F DIAST BP 80-89 MM HG: CPT | Performed by: INTERNAL MEDICINE

## 2024-09-30 PROCEDURE — 3074F SYST BP LT 130 MM HG: CPT | Performed by: INTERNAL MEDICINE

## 2024-09-30 PROCEDURE — RXMED WILLOW AMBULATORY MEDICATION CHARGE

## 2024-09-30 PROCEDURE — 1159F MED LIST DOCD IN RCRD: CPT | Performed by: INTERNAL MEDICINE

## 2024-09-30 PROCEDURE — 1157F ADVNC CARE PLAN IN RCRD: CPT | Performed by: INTERNAL MEDICINE

## 2024-09-30 NOTE — PATIENT INSTRUCTIONS
Assessment/Plan   1.  Bifascicular block.  Repeat EKG today.  No dizziness.  2.  CAD.  2011 calcium score of 0.  2019 the calcium score is 50 units.  Now up to 73.  She has vague symptoms of chest discomfort 3 times a year.  An EKG was done today.   Continue the baby aspirin, atenolol, and Zetia.  Unable to tolerate statins.  I advise she start a PCSK9 inhibitor.  Exercise nuclear stress test July 2024 excellent functional capacity no symptoms no EKG changes normal perfusion ejection fraction 72%  3.  Hyperlipidemia.  Baseline .  Intolerant to statins in the past.  5/18/2023  HDL 65 triglycerides 344.  Recently her LDL went back up to 183.  She is agreeable to trying Repatha.  9/19/2024 LDL 47 HDL 68 triglycerides 96 LFTs normal.  However she stopped after the fourth dose because of getting a viral infection which she needed to the Repatha.  She has not been taking Zetia for a period of time.  She will have blood work followed up with her PCP.  I have encouraged her to get back on the Repatha.  Her target LDL is less than 70.      RTC 1 year

## 2024-10-02 ENCOUNTER — PHARMACY VISIT (OUTPATIENT)
Dept: PHARMACY | Facility: CLINIC | Age: 79
End: 2024-10-02
Payer: COMMERCIAL

## 2024-10-02 ENCOUNTER — SPECIALTY PHARMACY (OUTPATIENT)
Dept: PHARMACY | Facility: CLINIC | Age: 79
End: 2024-10-02

## 2024-10-02 NOTE — PROGRESS NOTES
[facility] Clinical Note    María Clifford is a 78 y.o. female, who is on the specialty pharmacy service for management of:  Hyperlipidemia Core.    María Clifford is taking: Repatha Sureclick 140mg under the skin every 14 days.    María was contacted on 10/2/2024 at 9:56 AM for a virtual pharmacy visit with encounter number [id number] from:   [facility]  [address] [place] [id number]  Dept: [phone]  Dept Fax: [phone]    The most recent encounter visit with the referring prescriber Davonte Mays MD on 9/30/24 was reviewed.  Pharmacy will continue to collaborate in the care of this patient with the referring prescriber Davonte Mays MD.    General Assessment  María plans to resume Repatha as prescribed following 2 missed doses from recent upper respiratory symptoms. She denies any side effects or efficacy concerns otherwise. Her most recent lipid panel shows LDL at goal.    Impression/Plan:  Is patient high risk?  Yes- geriatric  Is laboratory follow-up needed? As directed per provider  Is a clinical intervention needed? No  Next reassessment date? Approx. 6 months  Additional comments:     Refer to the encounter summary report for documentation details about patient counseling and education.      Medication Adherence    The importance of adherence was discussed with the patient and they were advised to take the medication as prescribed by their provider. Patient was encouraged to call their physician's office if they have a question regarding a missed dose.     QOL/Patient Satisfaction  Rate your quality of life on scale of 1-10: 8  Rate your satisfaction with  Specialty Pharmacy on scale of 1-10: 10 - Completely satisfied      Patient was advised to contact the pharmacy if there are any changes to their medication list, including prescriptions, OTC medications, herbal products, or supplements. Patient was advised of [facility]'s dispensing process, refill timeline, contact information ([phone]), and patient management follow up. Patient confirmed understanding of education conducted during assessment. All patient questions and concerns were addressed to the best of my ability. Patient was encouraged to contact the specialty pharmacy with any questions or concerns.    Confirmed follow-up outreaches are properly scheduled and reviewed goals of therapy with the patient.        Diya Jeffery, PharmD

## 2024-10-10 ENCOUNTER — HOSPITAL ENCOUNTER (OUTPATIENT)
Dept: RADIOLOGY | Facility: CLINIC | Age: 79
Discharge: HOME | End: 2024-10-10
Payer: MEDICARE

## 2024-10-10 ENCOUNTER — OFFICE VISIT (OUTPATIENT)
Dept: PRIMARY CARE | Facility: CLINIC | Age: 79
End: 2024-10-10
Payer: MEDICARE

## 2024-10-10 VITALS — WEIGHT: 143 LBS | BODY MASS INDEX: 27.93 KG/M2 | SYSTOLIC BLOOD PRESSURE: 130 MMHG | DIASTOLIC BLOOD PRESSURE: 70 MMHG

## 2024-10-10 DIAGNOSIS — M10.9 ACUTE GOUT, UNSPECIFIED CAUSE, UNSPECIFIED SITE: Primary | ICD-10-CM

## 2024-10-10 DIAGNOSIS — M10.9 ACUTE GOUT, UNSPECIFIED CAUSE, UNSPECIFIED SITE: ICD-10-CM

## 2024-10-10 PROCEDURE — 1159F MED LIST DOCD IN RCRD: CPT | Performed by: STUDENT IN AN ORGANIZED HEALTH CARE EDUCATION/TRAINING PROGRAM

## 2024-10-10 PROCEDURE — 99213 OFFICE O/P EST LOW 20 MIN: CPT | Performed by: STUDENT IN AN ORGANIZED HEALTH CARE EDUCATION/TRAINING PROGRAM

## 2024-10-10 PROCEDURE — 1160F RVW MEDS BY RX/DR IN RCRD: CPT | Performed by: STUDENT IN AN ORGANIZED HEALTH CARE EDUCATION/TRAINING PROGRAM

## 2024-10-10 PROCEDURE — 73630 X-RAY EXAM OF FOOT: CPT | Mod: LT

## 2024-10-10 PROCEDURE — 1036F TOBACCO NON-USER: CPT | Performed by: STUDENT IN AN ORGANIZED HEALTH CARE EDUCATION/TRAINING PROGRAM

## 2024-10-10 PROCEDURE — 1157F ADVNC CARE PLAN IN RCRD: CPT | Performed by: STUDENT IN AN ORGANIZED HEALTH CARE EDUCATION/TRAINING PROGRAM

## 2024-10-10 PROCEDURE — 3075F SYST BP GE 130 - 139MM HG: CPT | Performed by: STUDENT IN AN ORGANIZED HEALTH CARE EDUCATION/TRAINING PROGRAM

## 2024-10-10 PROCEDURE — 3078F DIAST BP <80 MM HG: CPT | Performed by: STUDENT IN AN ORGANIZED HEALTH CARE EDUCATION/TRAINING PROGRAM

## 2024-10-10 RX ORDER — INDOMETHACIN 50 MG/1
50 CAPSULE ORAL
Qty: 45 CAPSULE | Refills: 0 | Status: SHIPPED | OUTPATIENT
Start: 2024-10-10 | End: 2024-10-25

## 2024-10-10 ASSESSMENT — PATIENT HEALTH QUESTIONNAIRE - PHQ9
2. FEELING DOWN, DEPRESSED OR HOPELESS: NOT AT ALL
1. LITTLE INTEREST OR PLEASURE IN DOING THINGS: NOT AT ALL
SUM OF ALL RESPONSES TO PHQ9 QUESTIONS 1 AND 2: 0

## 2024-10-10 NOTE — PROGRESS NOTES
Subjective   Patient ID: María Clifford is a 78 y.o. female who presents for left foot swollen.    Patient presents for sick visit- left foot is swelling on and off and causing some discomfort. Not sure if kicked something or stepped out of her husbands truck incorrectly. Reports the pain started a week ago. Pain located on the top of her foot over all of her toes. Reports the pain comes and goes randomly. Has stopping her from sleeping but then went and walked on her treadmill and was ok. Has been taking advil/tylenol but has not helped. Does endorse some swelling in the other ankle as well. Denies any pain in her big toe. Denies any new red meat/shellfish/red logan/alcohol use. No new medications.     Objective   /70   Wt 64.9 kg (143 lb)   BMI 27.93 kg/m²     Physical Exam  Constitutional:       Appearance: Normal appearance. She is normal weight.   HENT:      Head: Normocephalic and atraumatic.      Mouth/Throat:      Mouth: Mucous membranes are moist.   Eyes:      General: No scleral icterus.     Extraocular Movements: Extraocular movements intact.      Conjunctiva/sclera: Conjunctivae normal.      Pupils: Pupils are equal, round, and reactive to light.   Cardiovascular:      Rate and Rhythm: Normal rate and regular rhythm.   Pulmonary:      Effort: Pulmonary effort is normal.      Breath sounds: Normal breath sounds.   Abdominal:      General: Abdomen is flat. Bowel sounds are normal.      Palpations: Abdomen is soft.   Musculoskeletal:         General: Normal range of motion.      Comments: Left foot tenderness at the top of the foot w/ overlying erythema (mild); w/ hyperalgesia to touch   Skin:     General: Skin is warm and dry.   Neurological:      General: No focal deficit present.      Mental Status: She is alert.   Psychiatric:         Mood and Affect: Mood normal.         Behavior: Behavior normal.       Assessment/Plan   Patient reports for sick visit: new onset left foot pain    #Left Foot  Pain  #C/F Gout  -left foot x ray   -indomethacin for gout flare, will encourage patient to maintain adequate fluid intake on this    RTC in 6-8 months or PRN     Elsa Armenta MD  PGY-2 Internal Medicine  Plan is not finalized till staffed with the attending physician Dr. Winn.    Pt seen in conjunction with resident physician, concern with gout due to        Patient seen in conjunction with resident physician, suspect there is a gout flare, recommend indomethacin, advised stay well-hydrated, ESR CRP closely monitor symptoms she is agreeable to plan

## 2024-10-24 DIAGNOSIS — E03.9 HYPOTHYROIDISM, UNSPECIFIED TYPE: ICD-10-CM

## 2024-10-24 RX ORDER — LEVOTHYROXINE SODIUM 75 UG/1
75 TABLET ORAL DAILY
Qty: 90 TABLET | Refills: 0 | Status: SHIPPED | OUTPATIENT
Start: 2024-10-24

## 2024-11-26 ENCOUNTER — OFFICE VISIT (OUTPATIENT)
Dept: GASTROENTEROLOGY | Facility: HOSPITAL | Age: 79
End: 2024-11-26
Payer: MEDICARE

## 2024-11-26 DIAGNOSIS — K76.0 NON-ALCOHOLIC FATTY LIVER DISEASE: Primary | ICD-10-CM

## 2024-11-26 PROCEDURE — 99204 OFFICE O/P NEW MOD 45 MIN: CPT | Performed by: NURSE PRACTITIONER

## 2024-11-26 PROCEDURE — G2211 COMPLEX E/M VISIT ADD ON: HCPCS | Performed by: NURSE PRACTITIONER

## 2024-11-26 PROCEDURE — 1160F RVW MEDS BY RX/DR IN RCRD: CPT | Performed by: NURSE PRACTITIONER

## 2024-11-26 PROCEDURE — 1159F MED LIST DOCD IN RCRD: CPT | Performed by: NURSE PRACTITIONER

## 2024-11-26 PROCEDURE — 1036F TOBACCO NON-USER: CPT | Performed by: NURSE PRACTITIONER

## 2024-11-26 PROCEDURE — 99214 OFFICE O/P EST MOD 30 MIN: CPT | Mod: 95 | Performed by: NURSE PRACTITIONER

## 2024-11-26 PROCEDURE — 1157F ADVNC CARE PLAN IN RCRD: CPT | Performed by: NURSE PRACTITIONER

## 2024-11-26 ASSESSMENT — ENCOUNTER SYMPTOMS
VOICE CHANGE: 0
FEVER: 0
HEMATURIA: 0
NUMBNESS: 0
COUGH: 0
DYSURIA: 0
CONFUSION: 0
ABDOMINAL DISTENTION: 0
BLOOD IN STOOL: 0
JOINT SWELLING: 0
DIARRHEA: 0
FREQUENCY: 0
DIFFICULTY URINATING: 0
SLEEP DISTURBANCE: 0
SHORTNESS OF BREATH: 0
UNEXPECTED WEIGHT CHANGE: 0
BRUISES/BLEEDS EASILY: 0
CHILLS: 0
APPETITE CHANGE: 0
DIZZINESS: 0
HEADACHES: 0
TROUBLE SWALLOWING: 0
ABDOMINAL PAIN: 0
CONSTIPATION: 0
PALPITATIONS: 0
COLOR CHANGE: 0
VOMITING: 0
NAUSEA: 0
TREMORS: 0
WEAKNESS: 0
AGITATION: 0
LIGHT-HEADEDNESS: 0
WHEEZING: 0

## 2024-11-26 NOTE — PROGRESS NOTES
This visit was completed using real-time HIPAA compliant telehealth tools, including audio/video connection between María Clifford and Amy Erazo NP in a private setting.      Patient ID: María Clifford is a 79 y.o. female who presents for evaluation of fatty liver.    HPI  79 year old with history of familial hyperlipidemia, HTN and hypothyroidism self referred for history of fatty liver.  Her  was recently seen in the office and she wanted to establish for known fatty liver.  She was diagnosed a few years back after having imaging fir RUQ discomfort and lab work.  This revealed hepatic steatosis and she changed her lifestyle at that time.  She also had noticed pale/yellowish stools which have since resolved.  She improved her diet and has been very adherent to limiting simple carb/sugars and junk food.  No ETOH.  No history of illicit drug use.    Most recent labs were stable.  Unable to locate imaging reports.    Denies jaundice, icterus, itching, abdominal distension, edema, bleeding or confusion.    Denies fevers, chills, abdominal pain.        Review of Systems   Constitutional:  Negative for appetite change, chills, fever and unexpected weight change.   HENT:  Negative for mouth sores, nosebleeds, trouble swallowing and voice change.    Eyes:  Negative for visual disturbance.   Respiratory:  Negative for cough, shortness of breath and wheezing.    Cardiovascular:  Negative for chest pain, palpitations and leg swelling.   Gastrointestinal:  Negative for abdominal distention, abdominal pain, blood in stool, constipation, diarrhea, nausea and vomiting.   Genitourinary:  Negative for decreased urine volume, difficulty urinating, dysuria, frequency, hematuria and urgency.   Musculoskeletal:  Negative for gait problem and joint swelling.   Skin:  Negative for color change, pallor and rash.   Neurological:  Negative for dizziness, tremors, weakness, light-headedness, numbness and headaches.   Hematological:   Does not bruise/bleed easily.   Psychiatric/Behavioral:  Negative for agitation, behavioral problems, confusion and sleep disturbance.        Objective   Physical Exam  Constitutional:       Appearance: Normal appearance.   HENT:      Head: Normocephalic and atraumatic.   Neurological:      General: No focal deficit present.      Mental Status: She is alert.   Psychiatric:         Mood and Affect: Mood normal.         Current Outpatient Medications   Medication Sig Dispense Refill    atenolol (Tenormin) 25 mg tablet take 1 tablet by mouth once daily 90 tablet 0    Bifidobacterium infantis (Align) 4 mg capsule       DOCOSAHEXAENOIC ACID ORAL omega 3-dha-epa-fish oil 967 mg-385 mg- 515 mg-1,290 mg capsule,delayed release(DR/EC) Take by mouth. 0 Active      evolocumab (Repatha SureClick) 140 mg/mL injection Inject 1 mL (140 mg) under the skin every 14 (fourteen) days. 6 mL 3    ezetimibe (Zetia) 10 mg tablet Take 1 tablet (10 mg) by mouth once daily. 90 tablet 1    magnesium oxide (Mag-Ox) 400 mg tablet 1 tablet (400 mg) once daily.      meclizine (Antivert) 25 mg tablet Take 1 tablet (25 mg) by mouth 3 times a day as needed for dizziness. 30 tablet 1    omega 3-dha-epa-fish oil 967 mg-385 mg- 515 mg-1,290 mg capsule,delayed release(DR/EC) Take by mouth.      Synthroid 75 mcg tablet Take 1 tablet (75 mcg) by mouth once daily. 90 tablet 0     No current facility-administered medications for this visit.     LABS:   Lab Results   Component Value Date    ALBUMIN 4.2 09/19/2024    BILITOT 0.5 09/19/2024    ALKPHOS 72 09/19/2024    ALT 19 09/19/2024    AST 26 09/19/2024    PROT 6.4 09/19/2024      Lab Results   Component Value Date    WBC 7.7 08/27/2024    HGB 13.8 08/27/2024    HCT 42.1 08/27/2024    MCV 98 08/27/2024     08/27/2024            Assessment/Plan   Problem List Items Addressed This Visit             ICD-10-CM    Non-alcoholic fatty liver disease - Primary K76.0     79 year old with history of hepatic  steatosis in the setting of HTN, HLD and hypothyroidism.  Normal liver tests.  Has adjusted her lifestyle after being diagnosed with fatty liver a couple years ago.  Has not had fibroscan.    Discussed natural history of fatty liver including risk factors and management.  Exercise/Activity  Vigorous physical activity like brisk walking or structured gym exercises 3 times a week for 30 minutes at minimum.    Resistance training to build muscle mass which will aid in weight loss.  Swimming, water aerobics are excellent forms of exercises that are easy on joints.    Exercise for 30 minutes or more at least 3 times a week  Aim to lose 7-10% of your total body weight over 6-12 months  Diet  Avoid/Limit simple carbs including simple sugars  Avoid eating foods that are rich in sugar in excess such as high sugar content fruits (pineapple, tong...) and desserts, cakes and pies  Eat more good foods that are rich in good fat such as cold water fish (salmon, swordfish...) as well as avocados and peanut butter in moderation  Snack on nuts that are high in protein and good oils such as walnuts, almonds.   Eat a mediteranean diet which is rich in grilled lean meats, high protein beans and legumes and olive oil.          Will complete fibroscan to grade and stage fatty liver.             Relevant Orders    Liver Elastography (Fibroscan)            SHAYY Shah 11/26/24 2:20 PM

## 2024-11-26 NOTE — ASSESSMENT & PLAN NOTE
79 year old with history of hepatic steatosis in the setting of HTN, HLD and hypothyroidism.  Normal liver tests.  Has adjusted her lifestyle after being diagnosed with fatty liver a couple years ago.  Has not had fibroscan.    Discussed natural history of fatty liver including risk factors and management.  Exercise/Activity  Vigorous physical activity like brisk walking or structured gym exercises 3 times a week for 30 minutes at minimum.    Resistance training to build muscle mass which will aid in weight loss.  Swimming, water aerobics are excellent forms of exercises that are easy on joints.    Exercise for 30 minutes or more at least 3 times a week  Aim to lose 7-10% of your total body weight over 6-12 months  Diet  Avoid/Limit simple carbs including simple sugars  Avoid eating foods that are rich in sugar in excess such as high sugar content fruits (pineapple, tong...) and desserts, cakes and pies  Eat more good foods that are rich in good fat such as cold water fish (salmon, swordfish...) as well as avocados and peanut butter in moderation  Snack on nuts that are high in protein and good oils such as walnuts, almonds.   Eat a mediteranean diet which is rich in grilled lean meats, high protein beans and legumes and olive oil.          Will complete fibroscan to grade and stage fatty liver.

## 2024-12-23 ENCOUNTER — SPECIALTY PHARMACY (OUTPATIENT)
Dept: PHARMACY | Facility: CLINIC | Age: 79
End: 2024-12-23

## 2024-12-23 PROCEDURE — RXMED WILLOW AMBULATORY MEDICATION CHARGE

## 2025-01-02 ENCOUNTER — PHARMACY VISIT (OUTPATIENT)
Dept: PHARMACY | Facility: CLINIC | Age: 80
End: 2025-01-02
Payer: COMMERCIAL

## 2025-01-24 DIAGNOSIS — E03.9 HYPOTHYROIDISM, UNSPECIFIED TYPE: ICD-10-CM

## 2025-01-24 RX ORDER — LEVOTHYROXINE SODIUM 75 UG/1
75 TABLET ORAL DAILY
Qty: 90 TABLET | Refills: 0 | Status: SHIPPED | OUTPATIENT
Start: 2025-01-24

## 2025-01-25 ENCOUNTER — OFFICE VISIT (OUTPATIENT)
Dept: URGENT CARE | Age: 80
End: 2025-01-25
Payer: MEDICARE

## 2025-01-25 VITALS
RESPIRATION RATE: 16 BRPM | WEIGHT: 141 LBS | SYSTOLIC BLOOD PRESSURE: 140 MMHG | BODY MASS INDEX: 27.54 KG/M2 | TEMPERATURE: 98.1 F | HEART RATE: 87 BPM | DIASTOLIC BLOOD PRESSURE: 74 MMHG | OXYGEN SATURATION: 96 %

## 2025-01-25 DIAGNOSIS — R10.9 FLANK PAIN, ACUTE: ICD-10-CM

## 2025-01-25 LAB
POC APPEARANCE, URINE: CLEAR
POC BILIRUBIN, URINE: NEGATIVE
POC BLOOD, URINE: NEGATIVE
POC COLOR, URINE: YELLOW
POC GLUCOSE, URINE: NEGATIVE MG/DL
POC KETONES, URINE: NEGATIVE MG/DL
POC LEUKOCYTES, URINE: NEGATIVE
POC NITRITE,URINE: NEGATIVE
POC PROTEIN, URINE: NEGATIVE MG/DL
POC UROBILINOGEN, URINE: 0.2 EU/DL

## 2025-01-25 PROCEDURE — 87086 URINE CULTURE/COLONY COUNT: CPT

## 2025-01-25 ASSESSMENT — ENCOUNTER SYMPTOMS
HEMATURIA: 0
BACK PAIN: 1
DYSURIA: 0

## 2025-01-25 NOTE — PROGRESS NOTES
Subjective   Patient ID: María Clifford is a 79 y.o. female. They present today with a chief complaint of Back Pain (Lower back pain - and b/l flank pain ).    History of Present Illness  Patient presents with concern for bilateral lower back pain ongoing for 3-4 days. Endorses no urinary symptoms, no pain management as she is unsure what she can take due to St 1 CKD, and fatty liver.       Back Pain  Pertinent negatives include no dysuria.       Past Medical History  Allergies as of 01/25/2025 - Reviewed 01/25/2025   Allergen Reaction Noted    Amoxicillin-pot clavulanate Swelling 11/20/2006    Penicillins Hives, Swelling, and Rash 11/20/2006    Amoxicillin Hives 01/17/2024    Bee pollen Other 05/18/2023    House dust Runny nose 05/18/2023       (Not in a hospital admission)       Past Medical History:   Diagnosis Date    Bifascicular block 05/18/2023    CAD (coronary artery disease) 05/18/2023 9/5/2023 Elevated CAC 73.22 Agatston units (LM 66.1, LAD 7.12).  3/19/2019 CAC 50 (LM 48.2, LAD 1.5)   Prior CAC = 0    Chest pain, atypical 05/18/2023    Always at rest ... Pinching L upper chest ... 2x/wk lasting a minute    Disease of intestine, unspecified     Intestinal disorder    Disease of upper respiratory tract, unspecified     Upper respiratory disease    Hypercholesterolemia 05/18/2023    Baseline    Difficulty with statins    Hypertension 05/18/2023    Personal history of other diseases of the circulatory system     History of cardiac murmur    Personal history of other diseases of the circulatory system     History of heart block    Personal history of other diseases of the digestive system     History of hepatic disease    Personal history of other diseases of the musculoskeletal system and connective tissue     History of arthritis    Personal history of other endocrine, nutritional and metabolic disease     History of hyperlipidemia    Personal history of other endocrine, nutritional and metabolic  disease     History of hyperthyroidism    Personal history of other specified conditions     History of headache    Personal history of pneumonia (recurrent)     History of pneumonia    Personal history of transient ischemic attack (TIA), and cerebral infarction without residual deficits     History of transient cerebral ischemia    Tachycardia 05/18/2023    Always above 100 bpm       Past Surgical History:   Procedure Laterality Date    COLONOSCOPY  11/20/2014    Colonoscopy (Fiberoptic)    OTHER SURGICAL HISTORY  11/20/2014    Bunion Correction By Lapidus Procedure        reports that she has never smoked. She has never used smokeless tobacco. She reports that she does not drink alcohol and does not use drugs.    Review of Systems  Review of Systems   Genitourinary:  Negative for dysuria, hematuria and urgency.   Musculoskeletal:  Positive for back pain. Negative for gait problem.                                  Objective    Vitals:    01/25/25 1522 01/25/25 1524   BP:  140/74   Pulse: 87    Resp: 16    Temp: 36.7 °C (98.1 °F)    SpO2: 96%    Weight: 64 kg (141 lb)      No LMP recorded. Patient is postmenopausal.    Physical Exam  Vitals reviewed.   Constitutional:       Appearance: Normal appearance.   Cardiovascular:      Rate and Rhythm: Normal rate.   Pulmonary:      Effort: Pulmonary effort is normal.   Musculoskeletal:         General: No tenderness.   Skin:     General: Skin is warm and dry.   Neurological:      Mental Status: She is alert.         Procedures    Point of Care Test & Imaging Results from this visit  No results found for this visit on 01/25/25.   No results found.    Diagnostic study results (if any) were reviewed by SHAYY Jerez.    Assessment/Plan   Allergies, medications, history, and pertinent labs/EKGs/Imaging reviewed by SHAYY Jerez. Advised urine shows no sign of infection/stone. Recommend topical management and f/up with PCP for further guidance on pain  management. Advised we will send the urine for culture and f/up if it is positive.     Medical Decision Making  At time of discharge patient was clinically well-appearing and HDS for outpatient management. The patient and/or family was educated regarding diagnosis, supportive care, OTC and Rx medications. The patient and/or family was given the opportunity to ask questions prior to discharge.  They verbalized understanding of my discussion of the plans for treatment, expected course, indications to return to  or seek further evaluation in ED, and the need for timely follow up as directed.   They were provided with a work/school excuse if requested.      Orders and Diagnoses  Diagnoses and all orders for this visit:  Acute bilateral low back pain without sciatica  -     POCT UA Automated manually resulted      Medical Admin Record      Patient disposition: Home    Electronically signed by SHAYY Jerez  3:58 PM

## 2025-01-27 LAB — BACTERIA UR CULT: NORMAL

## 2025-02-17 ENCOUNTER — CLINICAL SUPPORT (OUTPATIENT)
Dept: GASTROENTEROLOGY | Facility: CLINIC | Age: 80
End: 2025-02-17
Payer: MEDICARE

## 2025-02-17 DIAGNOSIS — K76.0 NON-ALCOHOLIC FATTY LIVER DISEASE: ICD-10-CM

## 2025-02-17 PROCEDURE — 91200 LIVER ELASTOGRAPHY: CPT | Performed by: STUDENT IN AN ORGANIZED HEALTH CARE EDUCATION/TRAINING PROGRAM

## 2025-03-11 ENCOUNTER — APPOINTMENT (OUTPATIENT)
Dept: RHEUMATOLOGY | Facility: CLINIC | Age: 80
End: 2025-03-11
Payer: MEDICARE

## 2025-03-11 VITALS
DIASTOLIC BLOOD PRESSURE: 59 MMHG | BODY MASS INDEX: 27.54 KG/M2 | SYSTOLIC BLOOD PRESSURE: 120 MMHG | WEIGHT: 141 LBS | HEART RATE: 64 BPM

## 2025-03-11 DIAGNOSIS — M35.01 SJOGREN'S SYNDROME WITH KERATOCONJUNCTIVITIS SICCA (MULTI): Primary | ICD-10-CM

## 2025-03-11 PROCEDURE — 1160F RVW MEDS BY RX/DR IN RCRD: CPT | Performed by: INTERNAL MEDICINE

## 2025-03-11 PROCEDURE — 3078F DIAST BP <80 MM HG: CPT | Performed by: INTERNAL MEDICINE

## 2025-03-11 PROCEDURE — 3074F SYST BP LT 130 MM HG: CPT | Performed by: INTERNAL MEDICINE

## 2025-03-11 PROCEDURE — 1157F ADVNC CARE PLAN IN RCRD: CPT | Performed by: INTERNAL MEDICINE

## 2025-03-11 PROCEDURE — 99214 OFFICE O/P EST MOD 30 MIN: CPT | Performed by: INTERNAL MEDICINE

## 2025-03-11 PROCEDURE — 1036F TOBACCO NON-USER: CPT | Performed by: INTERNAL MEDICINE

## 2025-03-11 PROCEDURE — 1159F MED LIST DOCD IN RCRD: CPT | Performed by: INTERNAL MEDICINE

## 2025-03-11 NOTE — PROGRESS NOTES
Recall    78 yr old with dry eyes and mouth. She is concerned that she may have Sjogren's syndrome. Has no Raynaud's or numbness or tingling. No rash or mouth sores. Has no joint pain. Blood work revealed negative RF and KISHOR.     Chief complaints: Follow up after the test results.    HPI: At today's visit, the patient reports persistent dry eyes and mouth. Her eye doctor prescribed OTC eye drops which are not helping.   Continues to have fatigue but no rash. She has left leg aching with tingling in the foot.   She took Salagen but discontinued once she ran out of prescription. Did not help the dry mouth    Patient Active Problem List   Diagnosis    Bifascicular block    CAD (coronary artery disease)    Chest pressure    Hypercholesterolemia    Hypertension    Hypothyroidism    Restless leg syndrome    Palpitations    Tachycardia    Venous anomaly ([facility]-HCC)    Genital pruritus    Degenerative cervical spinal stenosis    GERD (gastroesophageal reflux disease)    Prediabetes    Pseudoaneurysm of carotid artery    Weight loss    Sjogren syndrome with keratoconjunctivitis (Multi)    Non-alcoholic fatty liver disease    Flank pain, acute         Past Medical History:   Diagnosis Date    Bifascicular block 05/18/2023    CAD (coronary artery disease) 05/18/2023 9/5/2023 Elevated CAC 73.22 Agatston units (LM 66.1, LAD 7.12).  3/19/2019 CAC 50 (LM 48.2, LAD 1.5)   Prior CAC = 0    Chest pain, atypical 05/18/2023    Always at rest ... Pinching L upper chest ... 2x/wk lasting a minute    Disease of intestine, unspecified     Intestinal disorder    Disease of upper respiratory tract, unspecified     Upper respiratory disease    Hypercholesterolemia 05/18/2023    Baseline    Difficulty with statins    Hypertension 05/18/2023    Personal history of other diseases of the circulatory system     History of cardiac murmur    Personal history of other diseases of the circulatory system     History of heart block    Personal  history of other diseases of the digestive system     History of hepatic disease    Personal history of other diseases of the musculoskeletal system and connective tissue     History of arthritis    Personal history of other endocrine, nutritional and metabolic disease     History of hyperlipidemia    Personal history of other endocrine, nutritional and metabolic disease     History of hyperthyroidism    Personal history of other specified conditions     History of headache    Personal history of pneumonia (recurrent)     History of pneumonia    Personal history of transient ischemic attack (TIA), and cerebral infarction without residual deficits     History of transient cerebral ischemia    Tachycardia 05/18/2023    Always above 100 bpm            Past Surgical History:   Procedure Laterality Date    COLONOSCOPY  11/20/2014    Colonoscopy (Fiberoptic)    OTHER SURGICAL HISTORY  11/20/2014    Bunion Correction By Lapidus Procedure       Allergies   Allergen Reactions    Amoxicillin-Pot Clavulanate Swelling     lip swelling    Penicillins Hives, Swelling and Rash     lip swelling    Amoxicillin Hives    Bee Pollen Other    House Dust Runny nose       Medication Documentation Review Audit       Reviewed by Daniel Diego MA (Medical Assistant) on 03/11/25 at 0948      Medication Order Taking? Sig Documenting Provider Last Dose Status   atenolol (Tenormin) 25 mg tablet [id number] No take 1 tablet by mouth once daily Rosendo Winn,  Taking Active   Bifidobacterium infantis (Align) 4 mg capsule [id number] No  Historical Provider, MD Taking Active   DOCOSAHEXAENOIC ACID ORAL [id number] No omega 3-dha-epa-fish oil 967 mg-385 mg- 515 mg-1,290 mg capsule,delayed release(DR/EC) Take by mouth. 0 Active Historical Provider, MD Taking Active   evolocumab (Repatha SureClick) 140 mg/mL injection [id number] No Inject 1 mL (140 mg) under the skin every 14 (fourteen) days. Davonte Mays MD Taking Active   ezetimibe (Zetia) 10 mg  tablet [id number] No Take 1 tablet (10 mg) by mouth once daily. Rosendo Winn DO Taking Active   magnesium oxide (Mag-Ox) 400 mg tablet [id number] No 1 tablet (400 mg) once daily. Historical Provider, MD Taking Active   omega 3-dha-epa-fish oil 967 mg-385 mg- 515 mg-1,290 mg capsule,delayed release(DR/EC) [id number] No Take by mouth. Historical Provider, MD Taking Active   Synthroid 75 mcg tablet [id number]  TAKE ONE TABLET BY MOUTH EVERY DAY Hector Tamayo MD  Active                        No family history on file.       Social History     Tobacco Use    Smoking status: Never    Smokeless tobacco: Never   Substance Use Topics    Alcohol use: Never    Drug use: Never         Review of Systems    REVIEW OF SYSTEMS:  Constitutional: Has fatigue  Skin:  No rash or psoriasis or photosensitvity  Head: No headache, oral ulcers or hair loss  Neck: No difficulty swallowing or choking  Eyes: Has dry eyes but no iritis  Mouth: Has dry mouth but no oral ulcers  Pulmonary: No wheezing, pleurisy or SOB  Cardiovascular: No chest pain or palpitations  Gastrointestinal: No abdominal pain, nausea, heartburn, or blood in stool  Endocrine: No Raynaud's   Musculoskeletal: As H/P          PHYSICAL EXAM:  Visit Vitals  /59   Pulse 64     General - NAD, sitting up in chair, well-groomed, pleasant, AAOx3  Head: Normocephalic, atraumatic  Eyes - PERRLA, EOMI. No conjunctiva injection.   Mouth/ENT -  No enlarged parotid or submandibular gland. Decreased salivary pooling.  Cardiovascular - Normal S1, S2. Regular rate and rhythm. No murmurs or rubs.  Lungs - Symmetric chest expansion. Wheezing on auscultation  Skin - No rashes or ulcers. Skin warm and dry. No erythema on bilateral cheeks.  Extremities - No edema, cyanosis ,or clubbing  Musculoskeletal -  EXAMJOINTDETAILED,  Shoulders: Full ROM, without pain, no swelling, warmth or tenderness.  Elbows: Full ROM, without pain, no swelling, warmth or tenderness.  Wrists: Full ROM, without  pain, no swelling, warmth or tenderness.  MCP: No swelling, warmth or tenderness. Metacarpal squeeze negative  PIP: No swelling, warmth or tenderness.  DIP: No swelling, warmth or tenderness.  Hands : 5/5.      Component      Latest Ref Rng 8/27/2024   WBC      4.4 - 11.3 x10*3/uL 7.7    nRBC      0.0 - 0.0 /100 WBCs 0.0    RBC      4.00 - 5.20 x10*6/uL 4.32    HEMOGLOBIN      12.0 - 16.0 g/dL 13.8    HEMATOCRIT      36.0 - 46.0 % 42.1    MCV      80 - 100 fL 98    MCH      26.0 - 34.0 pg 31.9    MCHC      32.0 - 36.0 g/dL 32.8    RED CELL DISTRIBUTION WIDTH      11.5 - 14.5 % 13.7    Platelets      150 - 450 x10*3/uL 276    Neutrophils %      40.0 - 80.0 % 57.7    Immature Granulocytes %, Automated      0.0 - 0.9 % 1.2 (H)    Lymphocytes %      13.0 - 44.0 % 33.7    Monocytes %      2.0 - 10.0 % 4.7    Eosinophils %      0.0 - 6.0 % 1.9    Basophils %      0.0 - 2.0 % 0.8    Neutrophils Absolute      1.60 - 5.50 x10*3/uL 4.46    Immature Granulocytes Absolute, Automated      0.00 - 0.50 x10*3/uL 0.09    Lymphocytes Absolute      0.80 - 3.00 x10*3/uL 2.60    Monocytes Absolute      0.05 - 0.80 x10*3/uL 0.36    Eosinophils Absolute      0.00 - 0.40 x10*3/uL 0.15    Basophils Absolute      0.00 - 0.10 x10*3/uL 0.06    ANTI-SM TEST      <1.0 AI <0.2    Anti-RNP      <1.0 AI <0.2    Anti-SM/RNP      <1.0 AI <0.2    Anti-SSA      <1.0 AI <0.2    Anti-SSB      <1.0 AI <0.2    Anti-SCL-70      <1.0 AI <0.2    Anti-ALANIS-1 IgG      <1.0 AI <0.2    Anti-Chromatin      <1.0 AI <0.2    Anti-Centromere      <1.0 AI <0.2    ANTI-RIBOSOMAL P      <1.0 AI <0.2    Anti-DNA (DS)      <5.0 IU/mL <1.0    Albumin      3.4 - 5.0 g/dL 4.0    Alpha 1 Globulin      0.2 - 0.6 g/dL 0.3    Alpha 2 Globulin      0.4 - 1.1 g/dL 0.7    Beta Globulin      0.5 - 1.2 g/dL 0.8    Gamma      0.5 - 1.4 g/dL 0.8    Protein Electrophoresis Comment Normal.    Path Review - Serum Protein Electrophoresis  Reviewed and approved by LINA HOLLIS on  8/29/24 at 8:25 AM.    Sed Rate      0 - 30 mm/h 5    Rheumatoid Factor      0 - 15 IU/mL <10    KISHOR      Negative  Negative    Total Protein      6.4 - 8.2 g/dL 6.6            Assessment/Plan: 78 yr old with sicca symptoms. She has no antibodies suggestive of Sjogren's. Will get a minor salivary gland biopsy.     Reviewed and approved by JAVED EVANS on 3/11/25 at 9:57 AM.        Javed Evans MD

## 2025-03-18 ENCOUNTER — SPECIALTY PHARMACY (OUTPATIENT)
Dept: PHARMACY | Facility: CLINIC | Age: 80
End: 2025-03-18

## 2025-03-21 ENCOUNTER — HOSPITAL ENCOUNTER (OUTPATIENT)
Dept: RADIOLOGY | Facility: CLINIC | Age: 80
Discharge: HOME | End: 2025-03-21
Payer: MEDICARE

## 2025-03-21 ENCOUNTER — APPOINTMENT (OUTPATIENT)
Dept: PRIMARY CARE | Facility: CLINIC | Age: 80
End: 2025-03-21
Payer: MEDICARE

## 2025-03-21 VITALS
WEIGHT: 139 LBS | BODY MASS INDEX: 27.29 KG/M2 | OXYGEN SATURATION: 95 % | DIASTOLIC BLOOD PRESSURE: 78 MMHG | HEART RATE: 72 BPM | HEIGHT: 60 IN | SYSTOLIC BLOOD PRESSURE: 132 MMHG

## 2025-03-21 DIAGNOSIS — R23.1 COLD AND CLAMMY SKIN: ICD-10-CM

## 2025-03-21 DIAGNOSIS — M25.559 HIP PAIN, UNSPECIFIED LATERALITY: Primary | ICD-10-CM

## 2025-03-21 DIAGNOSIS — N39.41 URGE INCONTINENCE: ICD-10-CM

## 2025-03-21 DIAGNOSIS — M25.512 CHRONIC LEFT SHOULDER PAIN: ICD-10-CM

## 2025-03-21 DIAGNOSIS — D64.9 ANEMIA, UNSPECIFIED TYPE: ICD-10-CM

## 2025-03-21 DIAGNOSIS — G89.29 CHRONIC LEFT SHOULDER PAIN: ICD-10-CM

## 2025-03-21 DIAGNOSIS — M25.559 HIP PAIN, UNSPECIFIED LATERALITY: ICD-10-CM

## 2025-03-21 DIAGNOSIS — R20.9 COLD AND CLAMMY SKIN: ICD-10-CM

## 2025-03-21 DIAGNOSIS — M25.50 ARTHRALGIA, UNSPECIFIED JOINT: ICD-10-CM

## 2025-03-21 PROCEDURE — 1157F ADVNC CARE PLAN IN RCRD: CPT | Performed by: STUDENT IN AN ORGANIZED HEALTH CARE EDUCATION/TRAINING PROGRAM

## 2025-03-21 PROCEDURE — 1036F TOBACCO NON-USER: CPT | Performed by: STUDENT IN AN ORGANIZED HEALTH CARE EDUCATION/TRAINING PROGRAM

## 2025-03-21 PROCEDURE — 99214 OFFICE O/P EST MOD 30 MIN: CPT | Performed by: STUDENT IN AN ORGANIZED HEALTH CARE EDUCATION/TRAINING PROGRAM

## 2025-03-21 PROCEDURE — 3078F DIAST BP <80 MM HG: CPT | Performed by: STUDENT IN AN ORGANIZED HEALTH CARE EDUCATION/TRAINING PROGRAM

## 2025-03-21 PROCEDURE — G2211 COMPLEX E/M VISIT ADD ON: HCPCS | Performed by: STUDENT IN AN ORGANIZED HEALTH CARE EDUCATION/TRAINING PROGRAM

## 2025-03-21 PROCEDURE — 73502 X-RAY EXAM HIP UNI 2-3 VIEWS: CPT | Mod: LT

## 2025-03-21 PROCEDURE — 1159F MED LIST DOCD IN RCRD: CPT | Performed by: STUDENT IN AN ORGANIZED HEALTH CARE EDUCATION/TRAINING PROGRAM

## 2025-03-21 PROCEDURE — 1160F RVW MEDS BY RX/DR IN RCRD: CPT | Performed by: STUDENT IN AN ORGANIZED HEALTH CARE EDUCATION/TRAINING PROGRAM

## 2025-03-21 PROCEDURE — 3075F SYST BP GE 130 - 139MM HG: CPT | Performed by: STUDENT IN AN ORGANIZED HEALTH CARE EDUCATION/TRAINING PROGRAM

## 2025-03-21 RX ORDER — OXYBUTYNIN CHLORIDE 5 MG/1
5 TABLET, EXTENDED RELEASE ORAL DAILY
Qty: 90 TABLET | Refills: 1 | Status: SHIPPED | OUTPATIENT
Start: 2025-03-21 | End: 2025-09-17

## 2025-03-21 ASSESSMENT — ENCOUNTER SYMPTOMS
CARDIOVASCULAR NEGATIVE: 1
OCCASIONAL FEELINGS OF UNSTEADINESS: 0
FATIGUE: 1
PSYCHIATRIC NEGATIVE: 1
LOSS OF SENSATION IN FEET: 1
MUSCULOSKELETAL NEGATIVE: 1
WEAKNESS: 1
GASTROINTESTINAL NEGATIVE: 1
DEPRESSION: 0
RESPIRATORY NEGATIVE: 1

## 2025-03-21 ASSESSMENT — COLUMBIA-SUICIDE SEVERITY RATING SCALE - C-SSRS
1. IN THE PAST MONTH, HAVE YOU WISHED YOU WERE DEAD OR WISHED YOU COULD GO TO SLEEP AND NOT WAKE UP?: NO
6. HAVE YOU EVER DONE ANYTHING, STARTED TO DO ANYTHING, OR PREPARED TO DO ANYTHING TO END YOUR LIFE?: NO
2. HAVE YOU ACTUALLY HAD ANY THOUGHTS OF KILLING YOURSELF?: NO

## 2025-03-21 ASSESSMENT — PATIENT HEALTH QUESTIONNAIRE - PHQ9
2. FEELING DOWN, DEPRESSED OR HOPELESS: NOT AT ALL
SUM OF ALL RESPONSES TO PHQ9 QUESTIONS 1 AND 2: 0
1. LITTLE INTEREST OR PLEASURE IN DOING THINGS: NOT AT ALL

## 2025-03-21 NOTE — PROGRESS NOTES
Subjective   Patient ID: María Clifford is a 79 y.o. female.    Patient seen in routine follow-up.  Regards that she is overall doing well, however does have a couple of concerns.  She has been having some issues with underlying hip pain as well as muscle tenderness.  States that this is frequent throughout the day but does not inhibit her range of motion.  She is following with rheumatology for concerns for Sjogren's.  Otherwise, feels like she needs physical therapy for her shoulder again.  Having some issues with incontinence especially when she tries to rush to the bathroom.  She has not tried any sort of medication for this. In addition, is following with rheumatology due to concerns for Sjogren's, and does have some issues with extremity kind of numbness and tingling.  Otherwise no additional issues at this time. She is also very concerned about her , as he is a very poorly controlled diabetic and refuses to take any sort of injectable although he has been discussed that this potentially could be helpful for him.        Review of Systems   Constitutional:  Positive for fatigue.   HENT: Negative.     Respiratory: Negative.     Cardiovascular: Negative.    Gastrointestinal: Negative.    Musculoskeletal: Negative.    Neurological:  Positive for weakness.   Psychiatric/Behavioral: Negative.         Objective Visit Vitals  /78   Pulse 72   Ht 1.524 m (5')   Wt 63 kg (139 lb)   SpO2 95%   BMI 27.15 kg/m²   OB Status Postmenopausal   Smoking Status Never   BSA 1.63 m²      Physical Exam  Constitutional:       General: She is not in acute distress.     Appearance: She is not ill-appearing.   Eyes:      Pupils: Pupils are equal, round, and reactive to light.   Cardiovascular:      Rate and Rhythm: Normal rate and regular rhythm.      Pulses: Normal pulses.      Heart sounds: No murmur heard.  Pulmonary:      Effort: No respiratory distress.      Breath sounds: No wheezing.   Abdominal:      General: Abdomen  is flat. Bowel sounds are normal. There is no distension.   Musculoskeletal:      Right lower leg: No edema.      Left lower leg: No edema.      Comments: Full ROM of hip   Skin:     General: Skin is warm and dry.   Neurological:      Mental Status: She is alert. Mental status is at baseline.      Cranial Nerves: No cranial nerve deficit.      Motor: Weakness present.   Psychiatric:         Mood and Affect: Mood normal.         Behavior: Behavior normal.         Assessment/Plan   Diagnoses and all orders for this visit:  Hip pain, unspecified laterality  -     XR hip left with pelvis when performed 2 or 3 views; Future  -     Sedimentation Rate; Future  -     C-Reactive Protein; Future  Chronic left shoulder pain  -     Referral to Physical Therapy; Future  Anemia, unspecified type  -     Iron and TIBC; Future  Urge incontinence  -     Urinalysis with Reflex Microscopic; Future  -     oxybutynin XL (Ditropan XL) 5 mg 24 hr tablet; Take 1 tablet (5 mg) by mouth once daily. Do not crush, chew, or split.  -     Referral to Physical Therapy; Future  Arthralgia, unspecified joint  -     Sedimentation Rate; Future  -     C-Reactive Protein; Future  Cold and clammy skin  -     TSH with reflex to Free T4 if abnormal; Future  Patient seen on follow-up       #HTN  Stable well controlled, follows with cardiology    #HLD  She is unable to tolerate statins is on Zetia however cholesterol levels still are elevated.   -Patient reported significant side effects from Pcks9 inhibitor.  Thus, she stopped taking the medication and will follow-up with her cardiologist in this regard.     #fatty liver  Continue diet and exercise, as avoidance of statins secondary to this advised to discuss with cardiology    #Urge incontinence  Suspected based off symptoms recommend pelvic floor therapy oxybutynin discussed side effect profile    #anxiety/stres  Secondary to her husbamd continue to monitor could consider SSRi    #hip pain/muscle  pain  Could be rheum related appreciate recs, also concern for RLS, lab work consider duloxetine    #hypothyroid  Check Tsh     #osteopenia  Dexa scan, needs DEXA scan next year 2025  -vitamin D supplements      #Glaucoma  Would prefer alternative eyedrops, advised to discuss with ophthalmology could consider tacrolimus     #Cataract  -Patient is scheduled for cataract surgery.     #health maintaince  Previous records reviewed  UTD on age appropriate prevention  Depression screen neg  Medicare wellness at next visit        RTC in 6-8 month

## 2025-03-22 LAB
APPEARANCE UR: CLEAR
BILIRUB UR QL STRIP: NEGATIVE
COLOR UR: YELLOW
CRP SERPL-MCNC: NORMAL MG/L
ERYTHROCYTE [SEDIMENTATION RATE] IN BLOOD BY WESTERGREN METHOD: 6 MM/H
GLUCOSE UR QL STRIP: NEGATIVE
HGB UR QL STRIP: NEGATIVE
IRON SATN MFR SERPL: 27 % (CALC) (ref 16–45)
IRON SERPL-MCNC: 103 MCG/DL (ref 45–160)
KETONES UR QL STRIP: NEGATIVE
LEUKOCYTE ESTERASE UR QL STRIP: NEGATIVE
NITRITE UR QL STRIP: NEGATIVE
PH UR STRIP: 7 [PH] (ref 5–8)
PROT UR QL STRIP: NEGATIVE
SP GR UR STRIP: 1 (ref 1–1.03)
TIBC SERPL-MCNC: 382 MCG/DL (CALC) (ref 250–450)
TSH SERPL-ACNC: 3.2 MIU/L (ref 0.4–4.5)

## 2025-03-24 LAB
APPEARANCE UR: CLEAR
BILIRUB UR QL STRIP: NEGATIVE
COLOR UR: YELLOW
CRP SERPL-MCNC: <3 MG/L
ERYTHROCYTE [SEDIMENTATION RATE] IN BLOOD BY WESTERGREN METHOD: 6 MM/H
GLUCOSE UR QL STRIP: NEGATIVE
HGB UR QL STRIP: NEGATIVE
IRON SATN MFR SERPL: 27 % (CALC) (ref 16–45)
IRON SERPL-MCNC: 103 MCG/DL (ref 45–160)
KETONES UR QL STRIP: NEGATIVE
LEUKOCYTE ESTERASE UR QL STRIP: NEGATIVE
NITRITE UR QL STRIP: NEGATIVE
PH UR STRIP: 7 [PH] (ref 5–8)
PROT UR QL STRIP: NEGATIVE
SP GR UR STRIP: 1 (ref 1–1.03)
TIBC SERPL-MCNC: 382 MCG/DL (CALC) (ref 250–450)
TSH SERPL-ACNC: 3.2 MIU/L (ref 0.4–4.5)

## 2025-03-26 ENCOUNTER — SPECIALTY PHARMACY (OUTPATIENT)
Dept: PHARMACY | Facility: CLINIC | Age: 80
End: 2025-03-26

## 2025-03-28 DIAGNOSIS — I10 PRIMARY HYPERTENSION: ICD-10-CM

## 2025-03-29 RX ORDER — ATENOLOL 25 MG/1
25 TABLET ORAL DAILY
Qty: 90 TABLET | Refills: 0 | Status: SHIPPED | OUTPATIENT
Start: 2025-03-29

## 2025-03-31 NOTE — PROGRESS NOTES
Physical Therapy    Physical Therapy Evaluation and Treatment      Patient Name: María Clifford  MRN: [id number]  Today's Date: 4/2/2025  Time Calculation  Start Time: 0818  Stop Time: 0902  Time Calculation (min): 44 min    Insurance - reviewed   Visit number: 1 (updated 04/02/25)   Payor: HUMANA MEDICARE / Plan: HUMANA GOLD CHOICE / Product Type: *No Product type* /    VISITS ARE MED NEC NEEDS AUTH [employer] PAYS AT 90% AFTER .00 337.12 APPLIED OOP 1450.00 344.35 APPLIED   Referring Provider: Rosendo Winn DO       Assessment:  María Clifford  is a 79 y.o. old patient who participated in a physical therapy evaluation today due to chronic L shoulder pain. María presents with signs and symptoms consistent with degenerative RTC tears. María's impairments include: pain, decreased strength, and decreased range of motion.  Due to these impairments, she has the following functional limitations and participation restrictions: difficulty performing household activities, difficulty performing recreational activities, difficulty with sleeping, and increased time to complete ADLs/IADLs. María's clinical presentation is stable and/or uncomplicated characteristics with the level of complexity being low. María's potential for rehab is good.  Skilled physical therapy services are appropriate and beneficial in order to achieve measurable and meaningful change in the objective tests and measures. Utilization of skilled physical therapy services will aid in advancing her functional status and attaining her therapy-related goals. María verbalized understanding and is in agreement with all goals and plan of care.  María left session with all questions answered and no increase in symptoms.      PT Assessment  PT Assessment Results: Decreased strength, Decreased range of motion, Pain  Rehab Prognosis: Good     Plan: progress L shoulder ROM/strength as ramon, patient interested in addressing urge incontinence once shoulder  "improves  OP PT Plan  Treatment/Interventions: Blood flow restriction therapy, Cryotherapy, Dry needling, Education/ Instruction, Electrical stimulation, Hot pack, Gait training, Manual therapy, Neuromuscular re-education, Self care/ home management, Taping techniques, Therapeutic activities, Therapeutic exercises, Ultrasound  PT Plan: Skilled PT  PT Frequency: 1 time per week  Duration: 10V  Rehab Potential: Good  Plan of Care Agreement: Patient    Current Problem:   Problem List Items Addressed This Visit    None  Visit Diagnoses         Codes    Chronic left shoulder pain     M25.512, G89.29    Relevant Orders    Follow Up In Physical Therapy    Urge incontinence     N39.41              Subjective      María Clifford  is a 79 y.o. female  presenting to the clinic with chief concern of chronic L shoulder pain  Previously attended PT at [facility] [place] for suspected RTC tear with good relief, discharged in May 2024  Fell off of previously assigned exercise routine causing pain to gradually return    Reports symptoms are better with OTC pain medication  Reports symptoms are worse with putting on coat    María Clifford  denies:  numbness, tingling, diplopia, drop attacks, dysarthria, dysphagia, dizziness, saddle anesthesia, bowel changes, bladder changes, unexpected weight loss within the past 4 weeks, and unexpected weight gain within the past 4 weeks    Diagnostic images  L SHOULDER XRAY IMPRESSION 3/22/24  Mild degenerative changes left acromioclavicular joint unchanged from previous exam.    Medical History Form: Reviewed (scanned into chart)    Living Environment: single level house    Occupation: Retired      Prior Level of Function: IND with all mobility, ADLs/IADLs, driving    Exercise:  stationary pedal bike daily    Functional Limitations: household chores, self-care activities    Pt goals for therapy:  \"Put coat on without pain\"    Precautions:  Fall Risk: None   Denies: Cancer, Pacemaker, Diabetes, " Hypertension, latex allergy, epilepsy/seizures, other known cardiac problems, other known neurologic problem, other known pulmonary problems, unexpected weight gain or loss, saddle anesthesia, night sweats, night pain, diplopia, and drop attacks  Past Surgical history: hardware in L foot  Past Medical history: hypothyroidism, CVA, OA in feet/hands, osteopenia *no joint mobs*    Pain:  3/10 at rest and 7/10 with reaching  pain location: mid deltoid         Objective     Posture: mild forward head, rounded shoulders  Transfers: IND with use of hands  Gait: IND-forward flexed posture with decreased gait speed    Numbness/Tingling/Paresthesia: denies    Myotomes/Strength (MMT in sitting): *indicates pain  Shoulder Elevation (C4) R/L: 4+/3+*  Shoulder Abduction (C5) R/L: 4/3*  Shoulder Flexion R/L: 4/3  Shoulder Extension R/L: not tested  Shoulder IR R/L: 4+/3+*  Shoulder ER R/L: 4+/3+*  Elbow Flexion (C6) R/L: 4+/4+  Wrist Ext (C6) R/L: 4+/4  Elbow Extension (C7) R/L: 4+/4  Wrist Flexion (C7) R/L:  4+/4  Thumb Ext (C8) R/L: 5/5  Hand Intrinsics- abd/add (T1) R/L: 3+/5    Range of Motion: AROM via goniometer in degrees, * indicates pain  Shoulder Flexion R/L: 165/74 supine  Shoulder Extension R/L: not tested  Shoulder Abduction R/L: 140/61 supine  Shoulder IR functional reach behind back R/L: WNL/significantly limited*  Shoulder ER functional reach behind head R/L: WNL/mod limited*    Special Tests:  Faisal Dave: positive L side, negative R side  Lift off: positive L side, negative R side  Night pain: positive L side, negative R side    Outcome Measures:  Other Measures  Disability of Arm Shoulder Hand (DASH): 25     Treatments:  Therapeutic Exercise:    minutes    Access Code: [email or web address]  - Supine Shoulder Flexion Extension AAROM with Dowel,  2x10 for HEP demo  - Supine Shoulder Abduction AAROM with Dowel  x10 reps *discontinued due to pain*  - Seated L Shoulder Abduction Self-Mobilization with Towel Slide on Table  Top, 2x10 for HEP demo  - Seated L Shoulder Flexion Towel Slide at Table Top, 2x10    EDUCATION:  Outpatient Education  Individual(s) Educated: Patient  Education Provided: POC, Home Exercise Program, Anatomy  Risk and Benefits Discussed with Patient/Caregiver/Other: yes  Patient/Caregiver Demonstrated Understanding: yes  Plan of Care Discussed and Agreed Upon: yes  Patient Response to Education: Patient/Caregiver Verbalized Understanding of Information, Patient/Caregiver Performed Return Demonstration of Exercises/Activities, Patient/Caregiver Asked Appropriate Questions    -Educated María  on the role of PT and PT POC  -Educated María regarding benefit and purpose of skilled PT services along with results of examination findings and how this correlates to their chief concern  -Answered María's questions in full  -Educated María on relevant anatomy and the rationale for exercises  -María BRIGHT Clifford advised to hold any ex if it increases pain, María Clifford verbalized understanding      Goals:  STG's (within 2 weeks)  María Clifford will decrease pain by >/= 2/10 points from baseline to improve ability to perform household/recreational activities.    María Clifford will demonstrate independence,  excellent understanding of and report compliance with at least 3 exercises in her HEP to facilitate the learning process to maximize PT benefits and to facilitate independent rehab program upon discharge.    LTG's (by discharge)  María C Stack will improve global L shoulder AROM to at least 90% of contralateral UE to restore motion required for self-care and household activities.    María C Stack will reduce QuickDASH by at least 8 points (MCID) to demonstrate reduced pain with everyday tasks.    María C Stack will increase global L shoulder strength to at least 4+/5 MMT grade for improved shoulder stability and reduced risk of re-injury.     María C Stack will decrease pain to 0-2/10 to improve ability to perform  household/recreational activities.    María Clifford will be able to sleep through the night without waking due to pain in order to improve mental and physical well-being in order to safely participate in ADLs.     María Clifford will demonstrate independence,  excellent understanding of and report compliance with HEP to facilitate the learning process to maximize PT benefits and to facilitate independent rehab program upon discharge.    Time Calculation  Start Time: 0818  Stop Time: 0902  Time Calculation (min): 44 min

## 2025-04-02 ENCOUNTER — EVALUATION (OUTPATIENT)
Dept: PHYSICAL THERAPY | Facility: CLINIC | Age: 80
End: 2025-04-02
Payer: MEDICARE

## 2025-04-02 DIAGNOSIS — G89.29 CHRONIC LEFT SHOULDER PAIN: ICD-10-CM

## 2025-04-02 DIAGNOSIS — M25.512 CHRONIC LEFT SHOULDER PAIN: ICD-10-CM

## 2025-04-02 DIAGNOSIS — N39.41 URGE INCONTINENCE: ICD-10-CM

## 2025-04-02 PROCEDURE — 97110 THERAPEUTIC EXERCISES: CPT | Mod: GP

## 2025-04-02 PROCEDURE — 97161 PT EVAL LOW COMPLEX 20 MIN: CPT | Mod: GP

## 2025-04-02 SDOH — ECONOMIC STABILITY: FOOD INSECURITY: WITHIN THE PAST 12 MONTHS, THE FOOD YOU BOUGHT JUST DIDN'T LAST AND YOU DIDN'T HAVE MONEY TO GET MORE.: NEVER TRUE

## 2025-04-02 SDOH — ECONOMIC STABILITY: FOOD INSECURITY: WITHIN THE PAST 12 MONTHS, YOU WORRIED THAT YOUR FOOD WOULD RUN OUT BEFORE YOU GOT MONEY TO BUY MORE.: NEVER TRUE

## 2025-04-02 ASSESSMENT — COLUMBIA-SUICIDE SEVERITY RATING SCALE - C-SSRS
1. IN THE PAST MONTH, HAVE YOU WISHED YOU WERE DEAD OR WISHED YOU COULD GO TO SLEEP AND NOT WAKE UP?: NO
2. HAVE YOU ACTUALLY HAD ANY THOUGHTS OF KILLING YOURSELF?: NO
6. HAVE YOU EVER DONE ANYTHING, STARTED TO DO ANYTHING, OR PREPARED TO DO ANYTHING TO END YOUR LIFE?: NO

## 2025-04-03 NOTE — PROGRESS NOTES
Physical Therapy    Physical Therapy    Physical Therapy Treatment    Patient Name: María Clifford  MRN: [id number]  Today's Date: 4/4/2025  Time Calculation  Start Time: 0850  Stop Time: 0935  Time Calculation (min): 45 min  Insurance - reviewed   Visit number: 2 (updated 04/04/25)   Payor: HUMANA MEDICARE / Plan: HUMANA GOLD CHOICE / Product Type: *No Product type* /    10 v auth 4/2-6/27 [employer]HERE PAYS AT 90% AFTER .00 337.12 APPLIED OOP 1450.00 344.35 APPLIED   Referring Provider: Rosendo Winn DO      Current Problem  Problem List Items Addressed This Visit    None  Visit Diagnoses         Codes    Chronic left shoulder pain    -  Primary M25.512, G89.29              Precautions   Fall Risk: None   Denies: Cancer, Pacemaker, Diabetes, Hypertension, latex allergy, epilepsy/seizures, other known cardiac problems, other known neurologic problem, other known pulmonary problems, unexpected weight gain or loss, saddle anesthesia, night sweats, night pain, diplopia, and drop attacks  Past Surgical history: hardware in L foot  Past Medical history: hypothyroidism, CVA, OA in feet/hands, osteopenia *no joint mobs*    General  Subjective      María Clifford denies any falls or complications since last session. María Clifford reports c/c of tightness L anterior shld limiting her ability to don/doff coat    María Clifford reports good compliance with HEP.    Pain:  0/10 at rest; 2-3/10 w/ reaching; 8/10 w/ attempt to reach behind back   Pain location: L shld, proximal lat deltoid region       Objective     Treatments:  Abbreviation Key  NP = not performed this visit   NV = next visit  // = parallel    Assigned HEP- Medbridge [id number]    There ex    - Supine Shoulder Flexion Extension AAROM with Dowel  - 1 x daily - 7 x weekly - 3 sets - 10 reps  - Seated Shoulder Abduction Self-Mobilization with Towel Slide on Table  - 1 x daily - 7 x weekly - 3 sets - 10 reps  - Seated Shoulder Flexion Towel Slide at Table Top  - 1 x  daily - 7 x weekly - 3 sets - 10 reps  - Seated Cervical Sidebending Stretch  - 2 x daily - 7 x weekly - 3 sets - 30 seconds hold  - Gentle Levator Scapulae Stretch  - 2 x daily - 7 x weekly - 3 sets - 30 seconds hold  - Standing Shoulder Extension ROM with Dowel  - 2 x daily - 7 x weekly - 1-2 sets - 10 reps  - Standing Bilateral Shoulder Internal Rotation AAROM with Dowel  - 2 x daily - 7 x weekly - 1-2 sets - 10 reps    Manual Therapy:  33 minutes   Seated STM and myofascial cupping w/ external glide to L UT and triggerband L bicep  Brief passive stretching all planes L shld, mariia IR at 45 degrees ABD    Outpatient Education: Reviewed home exercise program. Home exercise program instructed and issued. Stretching into end range. Use heat/ice PRN.     María Clifford verbalizes understanding of all education provided: Yes    Assessment:  María Clifford completed treatment today which focused upon there ex and manual therapy in order to address Decreased L shld ROM limiting reaching, ADLS and self care.  Pt able to perform supine cane flexion into end range.  Improved IR reach to PSIS level post tx.  L bicep mod tight with Large TrP noted L UT limiting IR reach: may benefit from DN in future if tightness remains.   María's  response to tx was: Improved joint mobility.  Improved independence with home exercises. Reduced pain post treatment.. María's Progress towards goals: Patient reports there has not been a significant change in functional abilities.. María Clifford continues to have decreased strength, decreased ROM, and pain limiting participation in household chores and recreational activities. Further skilled therapy services are warranted to address the remaining impairments in order to progress towards functional goals. María left session with all questions answered and no increase in symptoms.      Plan:  progress L shoulder ROM/strength as ramon, patient interested in addressing urge incontinence once shoulder  improves       Time Calculation  Start Time: 0850  Stop Time: 0935  Time Calculation (min): 45 min     PT Therapeutic Procedures Time Entry  Manual Therapy Time Entry: 33  Therapeutic Exercise Time Entry: 12

## 2025-04-04 ENCOUNTER — TREATMENT (OUTPATIENT)
Dept: PHYSICAL THERAPY | Facility: CLINIC | Age: 80
End: 2025-04-04
Payer: MEDICARE

## 2025-04-04 DIAGNOSIS — M25.512 CHRONIC LEFT SHOULDER PAIN: Primary | ICD-10-CM

## 2025-04-04 DIAGNOSIS — G89.29 CHRONIC LEFT SHOULDER PAIN: Primary | ICD-10-CM

## 2025-04-04 PROCEDURE — 97110 THERAPEUTIC EXERCISES: CPT | Mod: GP,CQ

## 2025-04-04 PROCEDURE — 97140 MANUAL THERAPY 1/> REGIONS: CPT | Mod: GP,CQ

## 2025-04-16 PROCEDURE — RXMED WILLOW AMBULATORY MEDICATION CHARGE

## 2025-04-17 NOTE — PROGRESS NOTES
Physical Therapy    Physical Therapy    Physical Therapy Treatment    Patient Name: María Clifford  MRN: [id number]  Today's Date: 4/18/2025  Time Calculation  Start Time: 1148  Stop Time: 1235  Time Calculation (min): 47 min  Insurance - reviewed   Visit number: 3 (updated 04/18/25)   Payor: HUMANA MEDICARE / Plan: HUMANA GOLD CHOICE / Product Type: *No Product type* /    10 v auth 4/2-6/27 COHERE PAYS AT 90% AFTER .00 337.12 APPLIED OOP 1450.00 344.35 APPLIED   Referring Provider: Rosendo Winn DO      Current Problem  Problem List Items Addressed This Visit    None  Visit Diagnoses         Codes      Chronic left shoulder pain    -  Primary M25.512, G89.29      Urge incontinence     N39.41              Precautions   Fall Risk: None   Denies: Cancer, Pacemaker, Diabetes, Hypertension, latex allergy, epilepsy/seizures, other known cardiac problems, other known neurologic problem, other known pulmonary problems, unexpected weight gain or loss, saddle anesthesia, night sweats, night pain, diplopia, and drop attacks  Past Surgical history: hardware in L foot  Past Medical history: hypothyroidism, CVA, OA in feet/hands, osteopenia *no joint mobs*    General  Subjective      María Clifford denies any falls or complications since last session. María Clifford reports c/c of tightness L anterior shld limiting her ability to don/doff coat but feels significant improvement since last visit. Pt inquiring re: resuming some resisted ex she was performing in last round of PT    María Clifford reports good compliance with HEP.    Pain:  2/10  Pain location: L bicep and UT      Objective     Treatments:  Abbreviation Key  NP = not performed this visit   NV = next visit  // = parallel    Assigned HEP- Medbridge [id number]    Therapeutic Exercise: 30 minutes:  (can refer to PT note dated 5/9/24 for strengthening ex pt was performing in last round of PT). Pt has 2# wts at home.   - Supine Shoulder Flexion Extension AAROM with  Dowel  - 10x, to 156 degrees flexion  - Seated Shoulder Abduction Self-Mobilization with Towel Slide on Table  - NC  - Seated Shoulder Flexion Towel Slide up wall 10x  - Seated Cervical Sidebending Stretch  - 2 x daily - HEP/reviewed  - Gentle Levator Scapulae Stretch  - 2 x daily - HEP/reviewed  - Standing Shoulder Extension ROM with Dowel  - 2 x daily - 7 x weekly - 1-2 sets - 10 reps  - Standing Bilateral Shoulder Internal Rotation AAROM with Dowel  - 2 x daily - 7 x weekly - 1-2 sets - 10 reps  - Sleeper IR Stretch  - 1 x daily - 7 x weekly - 10 reps - 10 seconds hold  - Standing Bicep Curls Supinated with 2# Dumbbells  - 1 x daily - 7 x weekly - 2 sets - 10 reps  - Standing Bent Over Bilateral Shoulder Row with 2# Dumbbells  - 1 x daily - 7 x weekly - 2 sets - 10 reps  - Standing Shoulder Flexion to 90 Degrees  0# - 1 x daily - 7 x weekly - 2 sets - 10 reps  -stand mid row using GTB 2x10    Manual Therapy:  17 minutes   Seated STM and myofascial cupping w/ external glide to L UT and triggerband L bicep  Brief passive stretching all planes L shld, mariia IR at 45 degrees ABD    Outpatient Education: Reviewed home exercise program. Home exercise program instructed and issued. Stretching into end range. Use heat/ice PRN.     María Clifford verbalizes understanding of all education provided: Yes    Assessment:  María BRIGHT Clifford completed treatment today which focused upon there ex and manual therapy in order to address Decreased L shld ROM limiting reaching, ADLS and self care.  Rapid improvement in ROM noted since last visit:  supine cane flexion 156 degrees.  Equal ER noted post manual therapy.  IR reach remains limited to L buttock region. HEP updated to address.  Per pt inquiry, able to resume some stabilization ex pt was performing in 2024 round of PT w/ + fatigue, but no elevated pain.    María's  response to tx was: Improved joint mobility.  Improved independence with home exercises. Patient was appropriately  fatigued with no complaints. Reduced pain post treatment.. María's Progress towards goals: Improved reaching ability. Improved ability to complete household activities. Reduced intensity of pain.. María Clifford continues to have decreased strength, decreased ROM, and pain limiting participation in household chores and recreational activities. Further skilled therapy services are warranted to address the remaining impairments in order to progress towards functional goals. María left session with all questions answered and no increase in symptoms.      Plan:  progress L shoulder ROM/strength as ramon, patient interested in addressing urge incontinence once shoulder improves       Time Calculation  Start Time: 1148  Stop Time: 1235  Time Calculation (min): 47 min     PT Therapeutic Procedures Time Entry  Manual Therapy Time Entry: 17  Therapeutic Exercise Time Entry: 30

## 2025-04-18 ENCOUNTER — TREATMENT (OUTPATIENT)
Dept: PHYSICAL THERAPY | Facility: CLINIC | Age: 80
End: 2025-04-18
Payer: MEDICARE

## 2025-04-18 DIAGNOSIS — G89.29 CHRONIC LEFT SHOULDER PAIN: Primary | ICD-10-CM

## 2025-04-18 DIAGNOSIS — N39.41 URGE INCONTINENCE: ICD-10-CM

## 2025-04-18 DIAGNOSIS — M25.512 CHRONIC LEFT SHOULDER PAIN: Primary | ICD-10-CM

## 2025-04-18 PROCEDURE — 97140 MANUAL THERAPY 1/> REGIONS: CPT | Mod: GP,CQ

## 2025-04-18 PROCEDURE — 97110 THERAPEUTIC EXERCISES: CPT | Mod: GP,CQ

## 2025-04-22 ENCOUNTER — TELEMEDICINE CLINICAL SUPPORT (OUTPATIENT)
Dept: PHARMACY | Facility: HOSPITAL | Age: 80
End: 2025-04-22
Payer: MEDICARE

## 2025-04-22 ENCOUNTER — TREATMENT (OUTPATIENT)
Dept: PHYSICAL THERAPY | Facility: CLINIC | Age: 80
End: 2025-04-22
Payer: MEDICARE

## 2025-04-22 DIAGNOSIS — M25.512 CHRONIC LEFT SHOULDER PAIN: ICD-10-CM

## 2025-04-22 DIAGNOSIS — G89.29 CHRONIC LEFT SHOULDER PAIN: ICD-10-CM

## 2025-04-22 PROCEDURE — 97110 THERAPEUTIC EXERCISES: CPT | Mod: GP

## 2025-04-22 NOTE — PROGRESS NOTES
"Physical Therapy    Physical Therapy    Physical Therapy Treatment    Patient Name: María Clifford  MRN: [id number]  Today's Date: 4/22/2025  Time Calculation  Start Time: 1550  Stop Time: 1633  Time Calculation (min): 43 min    Insurance - reviewed   Visit number: 4 (updated 04/22/25)   Payor: HUMANA MEDICARE / Plan: HUMANA GOLD CHOICE / Product Type: *No Product type* /    10 v auth 4/2-6/27 COHERE PAYS AT 90% AFTER .00 337.12 APPLIED OOP 1450.00 344.35 APPLIED   Referring Provider: Rosendo Winn DO      Current Problem  Problem List Items Addressed This Visit    None  Visit Diagnoses         Codes      Chronic left shoulder pain     M25.512, G89.29              Precautions   Fall Risk: None   Denies: Cancer, Pacemaker, Diabetes, Hypertension, latex allergy, epilepsy/seizures, other known cardiac problems, other known neurologic problem, other known pulmonary problems, unexpected weight gain or loss, saddle anesthesia, night sweats, night pain, diplopia, and drop attacks  Past Surgical history: hardware in L foot  Past Medical history: hypothyroidism, CVA, OA in feet/hands, osteopenia *no joint mobs*    General  Subjective      María Clifford denies any falls or complications since last session. María Clifford reports L shoulder is gradually improving: decreased pain at rest.     María Clifford reports good compliance with HEP.    Pain:  3/10 \"with wrong movement\"  Pain location: L bicep and UT      Objective     Treatments:  Abbreviation Key  NP = not performed this visit   NV = next visit  // = parallel    Assigned HEP- Medbridge [id number]    Therapeutic Exercise:  38 minutes    Supine L shoulder flex AROM to 90 deg, 3x10  Sidelying L shoulder ABD AROM, 3x10  Standing shoulder ext using red theraband, 3x10  Standing mid row using green theraband, 3x10  Standing Bilateral Shoulder Internal Rotation AAROM with Dowel, 2x10    Manual Therapy:  5 minutes   Performed with pt seated  STM over L UT and cervical " paraspinals    Outpatient Education: Reviewed home exercise program. Provided manual cues for correct performance of exercises. Provided verbal feedback to improve exercise technique.   María Clifford verbalizes understanding of all education provided: Yes    Assessment:  María Clifford completed treatment today which focused upon scapular and devante-scapular strengthening to address suspected L degenerative RTC tear. Observed good improvement in L shoulder flex and ABD AROM since initial eval. Performed reps of shoulder flex and ABD for strengthening- patient appropriately challenged. Mod verbal + manual cueing for appropriate form/technique with good follow through. Increased neck pain and L forearm pain with shoulder IR AAROM- educated patient on RTC s/s not going below elbow, will monitor for cervical radiculopathy. Performed STM over cervical paraspinals and UT due to pain- symptoms returned to baseline.     María's response to tx was: Improved joint mobility.  Patient was appropriately fatigued with no complaints. María's Progress towards goals: Improved reaching ability. Improved ability to complete household activities. Reduced intensity of pain.. María Clifford continues to have decreased strength, decreased ROM, and pain limiting participation in household chores and recreational activities. Further skilled therapy services are warranted to address the remaining impairments in order to progress towards functional goals. María left session with all questions answered and no increase in symptoms.      Plan:  progress L shoulder ROM/strength as ramon, patient interested in addressing urge incontinence once shoulder improves       Time Calculation  Start Time: 1550  Stop Time: 1633  Time Calculation (min): 43 min     PT Therapeutic Procedures Time Entry  Manual Therapy Time Entry: 5  Therapeutic Exercise Time Entry: 38

## 2025-04-22 NOTE — PROGRESS NOTES
[facility] Clinical Note  Patient Reassessment     Introduction  María Clifford is a 79 y.o. female who is on the specialty pharmacy service for management of: Hyperlipidemia Core.      [facility] supplied medication: Repatha Sureclick 140mg under the skin every 14 days    Duration of therapy: Maintenance    The most recent encounter visit with the referring prescriber Davonte Mays MD on 9/30/24 was reviewed.  Pharmacy will continue to collaborate in the care of this patient with the referring prescriber.    Discussion  María was contacted on 4/22/2025 at 10:02 AM for a pharmacy visit with encounter number [id number] from:   [facility]  [address]  [facility] [address]  [place] [address]  Dept: [phone]  Dept Fax: [phone]  Loc: [phone]  María consented to a/an Telephone visit, which was performed.    Efficacy  Patient has developed new symptoms of condition: No  Patient/caregiver feels medication is affecting the disease state: LDL at goal    Goals  Provided education on goals and possible outcomes of therapy:  Adherence with therapy  Timely completion of appropriate labs  Timely and appropriate follow up with provider  Identify and address medication interactions with presciption medications, OTC medications and supplements  Optimize or maintain quality of life  Hyperlipidemia: Reduce LDL by at least 50% from baseline  Reduce risk of future cardiovascular events   Patient status for goal(s): Complete/Achieved    Tolerance  Patient has experienced side effects from this medication: No  Changes to current therapy regimen: Yes - Will resume ezetimibe and fish oil (stopped on her own previously)    The follow-up timeline was discussed. Every person responds to and reacts to therapy differently. Patient should be assessed for efficacy and tolerability in approximately: annually    Adherence  Patient Information  Informant: Self  (Patient)  Demonstrates Understanding of Importance of Adherence: Yes  Does the patient have any barriers to self-administration (including physical and mental?): No  Medication Information  Medication: evolocumab (Repatha SureClick)  Patient Reported Missed Doses in the Last 4 Weeks: 0  Estimated Medication Adherence Level: %  Adherence Estimation Source: Claims history  Barriers to Adherence: No Problems identified  What concerns do you have regarding your medications?: none   The importance of adherence was discussed and patient/caregiver was advised to take the medication as prescribed by their provider. Encouraged patient/caregiver to call physician's office or specialty pharmacy if they have a question regarding a missed dose.    General Assessment  Changes to home medications, OTCs or supplements: No  Current Medications[1]  Reported new allergies: No  Reported new medical conditions: No  Additional monitoring reviewed: Cardiology - Lipid panel:   Lab Results   Component Value Date    CHOL 134 09/19/2024    HDL 68.2 09/19/2024    CHHDL 2.0 09/19/2024    LDLCALC 47 09/19/2024    VLDL 19 09/19/2024    TRIG 96 09/19/2024    NHDL 66 09/19/2024     Is laboratory follow up needed? Not at this time    Advised to contact the pharmacy if there are any changes to the patient's medication list, including prescriptions, OTC medications, herbal products, or supplements.    Impression/Plan  This patient has been identified as high risk due to Geriatric (over 65 years of age).  The following action was taken: Patient/caregiver encouraged to participate in patient management program.    QOL/Patient Satisfaction  Rate your quality of life on scale of 1-10: 9  Rate your satisfaction with  Specialty Pharmacy on scale of 1-10: 10 - Completely satisfied    Provided contact information ([phone]) for [facility] and reviewed dispensing process, refill timeline and patient management follow  up. Confirmed understanding of education conducted during assessment. All questions and concerns were addressed and patient/caregiver was encouraged to reach out for additional questions or concerns.    Based on the patient's diagnosis, medication list, progress towards goals, adherence, tolerance, and medication list, medication remains appropriate: Therapy remains appropriate (I attest)    Diya Jeffery, PharmD        [1]   Current Outpatient Medications   Medication Sig Dispense Refill    atenolol (Tenormin) 25 mg tablet Take 1 tablet (25 mg) by mouth once daily. (Patient taking differently: Take 0.25 tablets (6.25 mg) by mouth once daily.) 90 tablet 0    evolocumab (Repatha SureClick) 140 mg/mL injection Inject 1 mL (140 mg) under the skin every 14 (fourteen) days. 6 mL 3    magnesium oxide (Mag-Ox) 400 mg tablet 1 tablet (400 mg) once daily.      omega 3-dha-epa-fish oil 967 mg-385 mg- 515 mg-1,290 mg capsule,delayed release(DR/EC) Take by mouth.      Synthroid 75 mcg tablet TAKE ONE TABLET BY MOUTH EVERY DAY 90 tablet 0    ezetimibe (Zetia) 10 mg tablet Take 1 tablet (10 mg) by mouth once daily. 90 tablet 1    oxybutynin XL (Ditropan XL) 5 mg 24 hr tablet Take 1 tablet (5 mg) by mouth once daily. Do not crush, chew, or split. (Patient not taking: Reported on 4/22/2025) 90 tablet 1     No current facility-administered medications for this visit.

## 2025-04-24 ENCOUNTER — PHARMACY VISIT (OUTPATIENT)
Dept: PHARMACY | Facility: CLINIC | Age: 80
End: 2025-04-24
Payer: COMMERCIAL

## 2025-04-28 NOTE — PROGRESS NOTES
History of Present Illness    María Clifford is a 79 y.o. female who is seen at the request of Dr. Tracy for a minor salivary gland biopsy.  This patient has had significant dryness of her mouth and eyes.      Past Medical History    The past medical history review of system shows cardiac issues, high blood pressure, elevated cholesterol.  Her medications are documented in the chart.  She does have allergy to penicillin.  She gets hives.  She does not smoke.  She is here alone today.    Physical Exam    The patient is alert and oriented.  She has bilateral significant impacted cerumen which was addressed with a small instrument.  Examination of the external ears, ear canals, and eardrums, is within normal limits. Examination of the anterior and external nose is negative. Examination of the oral cavity and oropharynx is normal. There is no evidence of any mucosal lesions. There is good mobility of the tongue and palate. There is good mandibular excursion. Palpation of the parotid, neck, and thyroid field fails to show any worrisome masses or adenopathies.    After verbal consent and under Xylocaine 1% to 100,000 epinephrine a small incision was made on the inner aspect of the right lower lip.  The 6 glands were harvested.  The area was closed with 5-0 fast.  This was well-tolerated.    Assessment and Plan    Possible Sjogren's for which the patient had a minor salivary gland biopsy done today.  She was instructed in the care of the wound.    Bilateral impacted cerumen which was addressed with a small instrument.    I will see her in 6 months.

## 2025-04-29 ENCOUNTER — APPOINTMENT (OUTPATIENT)
Facility: CLINIC | Age: 80
End: 2025-04-29
Payer: MEDICARE

## 2025-04-29 VITALS — BODY MASS INDEX: 27.82 KG/M2 | WEIGHT: 138 LBS | HEIGHT: 59 IN

## 2025-04-29 DIAGNOSIS — M35.01 SJOGREN'S SYNDROME WITH KERATOCONJUNCTIVITIS SICCA: ICD-10-CM

## 2025-04-29 DIAGNOSIS — H61.23 BILATERAL IMPACTED CERUMEN: Primary | ICD-10-CM

## 2025-04-29 PROCEDURE — 1160F RVW MEDS BY RX/DR IN RCRD: CPT | Performed by: OTOLARYNGOLOGY

## 2025-04-29 PROCEDURE — 1036F TOBACCO NON-USER: CPT | Performed by: OTOLARYNGOLOGY

## 2025-04-29 PROCEDURE — 1159F MED LIST DOCD IN RCRD: CPT | Performed by: OTOLARYNGOLOGY

## 2025-04-29 PROCEDURE — 1157F ADVNC CARE PLAN IN RCRD: CPT | Performed by: OTOLARYNGOLOGY

## 2025-04-29 PROCEDURE — 69210 REMOVE IMPACTED EAR WAX UNI: CPT | Performed by: OTOLARYNGOLOGY

## 2025-04-29 PROCEDURE — 99203 OFFICE O/P NEW LOW 30 MIN: CPT | Performed by: OTOLARYNGOLOGY

## 2025-04-29 PROCEDURE — 42405 BIOPSY OF SALIVARY GLAND: CPT | Performed by: OTOLARYNGOLOGY

## 2025-04-29 RX ORDER — BISMUTH SUBSALICYLATE 262 MG
1 TABLET,CHEWABLE ORAL DAILY
COMMUNITY

## 2025-04-29 RX ORDER — ASPIRIN 81 MG/1
81 TABLET ORAL DAILY
COMMUNITY

## 2025-04-30 ENCOUNTER — TREATMENT (OUTPATIENT)
Dept: PHYSICAL THERAPY | Facility: CLINIC | Age: 80
End: 2025-04-30
Payer: MEDICARE

## 2025-04-30 DIAGNOSIS — G89.29 CHRONIC LEFT SHOULDER PAIN: ICD-10-CM

## 2025-04-30 DIAGNOSIS — M25.512 CHRONIC LEFT SHOULDER PAIN: ICD-10-CM

## 2025-04-30 PROCEDURE — 97110 THERAPEUTIC EXERCISES: CPT | Mod: GP

## 2025-04-30 NOTE — PROGRESS NOTES
"Physical Therapy    Physical Therapy    Physical Therapy Treatment    Patient Name: María Clifford  MRN: [id number]  Today's Date: 4/30/2025  Time Calculation  Start Time: 1434  Stop Time: 1513  Time Calculation (min): 39 min    Insurance - reviewed   Visit number: 5 (updated 04/30/25)   Payor: HUMANA MEDICARE / Plan: HUMANA GOLD CHOICE / Product Type: *No Product type* /    10 v auth 4/2-6/27 COHERE PAYS AT 90% AFTER .00 337.12 APPLIED OOP 1450.00 344.35 APPLIED   Referring Provider: Rosendo Winn DO      Current Problem  Problem List Items Addressed This Visit    None  Visit Diagnoses         Codes      Chronic left shoulder pain     M25.512, G89.29              Precautions   Fall Risk: None   Denies: Cancer, Pacemaker, Diabetes, Hypertension, latex allergy, epilepsy/seizures, other known cardiac problems, other known neurologic problem, other known pulmonary problems, unexpected weight gain or loss, saddle anesthesia, night sweats, night pain, diplopia, and drop attacks  Past Surgical history: hardware in L foot  Past Medical history: hypothyroidism, CVA, OA in feet/hands, osteopenia *no joint mobs*    General  Subjective      María Clifford confirms 1 fall since last session: tripped up stairs while carrying laundry. María Clifford reports L shoulder has felt \"less restricted\" since fall.     María Clifford reports good compliance with HEP.    Pain:  0/10 currently  Pain location: L bicep and UT      Objective   L shoulder AROM  Flex: 140 deg  ABD: 120 deg  ER functional reach behind head and IR functional reach behind back WNL/symmetrical to R side    Treatments:  Abbreviation Key  NP = not performed this visit   NV = next visit  // = parallel    Assigned HEP- Medbridge [id number]    Therapeutic Exercise:  39 minutes    Standing shoulder flex to 90 deg using 2# DB, 3x10  Standing shoulder ABD AROM to 90 deg, 3x10  Standing scap retraction with ER using yellow theraband, 3x10  Standing L shoulder IR using " red theraband, 3x10  Standing L shoulder ER using red theraband, 3x10 *fatigued*  Standing mid row using red theraband, 3x10 with 3 sec hold  Standing shoulder ext using red theraband, 3x10  Standing bicep curls using 4# DB, 3x10  Wall push up with arms wide, 3x10      Outpatient Education: Reviewed home exercise program. Provided manual cues for correct performance of exercises. Provided verbal feedback to improve exercise technique.   María Clifford verbalizes understanding of all education provided: Yes    Assessment:  María Clifford completed treatment today which focused upon scapular strengthening to address suspected L degenerative RTC tear. Significant improvement in L shoulder AROM and exercise tolerance since last visit. L shoulder AROM mildly lacking in flex and ABD only at this time. Progressed scapular strengthening to standing position for greater challenge against gravity without issue. No increased symptoms with initiating RTC strengthening. Mod verbal + manual cueing for appropriate form/technique with good follow through.    María's response to tx was: Improved joint mobility.  Patient was appropriately fatigued with no complaints. Improved tolerance to strengthening progressions. María's Progress towards goals: Improved reaching ability. Improved ability to complete household activities. Reduced intensity of pain. María Clifford continues to have decreased strength, decreased ROM, and pain limiting participation in household chores and recreational activities. Further skilled therapy services are warranted to address the remaining impairments in order to progress towards functional goals. María left session with all questions answered and no increase in symptoms.      Plan:  progress L shoulder ROM/strength as ramon, patient interested in addressing urge incontinence once shoulder improves       Time Calculation  Start Time: 1434  Stop Time: 1513  Time Calculation (min): 39 min     PT Therapeutic  Procedures Time Entry  Therapeutic Exercise Time Entry: 39

## 2025-05-05 LAB
LABORATORY COMMENT REPORT: NORMAL
PATH REPORT.FINAL DX SPEC: NORMAL
PATH REPORT.GROSS SPEC: NORMAL
PATH REPORT.RELEVANT HX SPEC: NORMAL
PATH REPORT.TOTAL CANCER: NORMAL
RESIDENT REVIEW: NORMAL

## 2025-05-06 DIAGNOSIS — E03.9 HYPOTHYROIDISM, UNSPECIFIED TYPE: ICD-10-CM

## 2025-05-07 ENCOUNTER — TREATMENT (OUTPATIENT)
Dept: PHYSICAL THERAPY | Facility: CLINIC | Age: 80
End: 2025-05-07
Payer: MEDICARE

## 2025-05-07 DIAGNOSIS — M25.512 CHRONIC LEFT SHOULDER PAIN: ICD-10-CM

## 2025-05-07 DIAGNOSIS — G89.29 CHRONIC LEFT SHOULDER PAIN: ICD-10-CM

## 2025-05-07 PROCEDURE — 97140 MANUAL THERAPY 1/> REGIONS: CPT | Mod: GP

## 2025-05-07 PROCEDURE — 97110 THERAPEUTIC EXERCISES: CPT | Mod: GP

## 2025-05-07 NOTE — PROGRESS NOTES
"Physical Therapy    Physical Therapy    Physical Therapy Treatment    Patient Name: María Clifford  MRN: [id number]  Today's Date: 5/7/2025  Time Calculation  Start Time: 1436  Stop Time: 1516  Time Calculation (min): 40 min    Insurance - reviewed   Visit number: 6 (updated 05/07/25)   Payor: HUMANA MEDICARE / Plan: HUMANA GOLD CHOICE / Product Type: *No Product type* /    10 v auth 4/2-6/27 COHERE PAYS AT 90% AFTER .00 337.12 APPLIED OOP 1450.00 344.35 APPLIED   Referring Provider: Rosendo Winn DO      Current Problem  Problem List Items Addressed This Visit    None  Visit Diagnoses         Codes      Chronic left shoulder pain     M25.512, G89.29              Precautions   Fall Risk: None   Denies: Cancer, Pacemaker, Diabetes, Hypertension, latex allergy, epilepsy/seizures, other known cardiac problems, other known neurologic problem, other known pulmonary problems, unexpected weight gain or loss, saddle anesthesia, night sweats, night pain, diplopia, and drop attacks  Past Surgical history: hardware in L foot  Past Medical history: hypothyroidism, CVA, OA in feet/hands, osteopenia *no joint mobs*    General  Subjective      María Clifford denies any falls or complications  Experiencing \"increased exhaustion due to age\"  Feels close to her norm    María Clifford reports fair compliance with HEP- limited by fatigue    Pain:  2/10 currently  Pain location: L bicep and UT      Objective   Pain at 104 deg L shoulder ABD pre-manual interventions vs pain free post    Treatments:  Abbreviation Key  NP = not performed this visit   NV = next visit  // = parallel    Assigned HEP- Medbridge [id number]    Manual Therapy:  17 minutes   STM/DTM over L mid-upper trap    Therapeutic Exercise:  23 minutes    Bent over row using 4# dumbbell, 3x10 each side  Standing mid row using red theraband, 3x10 with 3 sec hold  Standing shoulder ext using red theraband, 3x10  Standing L shoulder IR using red theraband, 3x10  Standing L " shoulder ER using red theraband, 3x10 *fatigued*  NC  Standing shoulder flex to 90 deg using 2# DB, 3x10  Standing shoulder ABD AROM to 90 deg, 3x10  Standing scap retraction with ER using yellow theraband, 3x10  Standing bicep curls using 4# DB, 3x10  Wall push up with arms wide, 3x10      Outpatient Education: Reviewed home exercise program. Provided manual cues for correct performance of exercises. Provided verbal feedback to improve exercise technique.   María Clifford verbalizes understanding of all education provided: Yes    Assessment:  María Clifford completed treatment which focused on manual interventions and scapular strengthening to address suspected L degenerative RTC tear. Demonstrated poor scapular mechanics (L UT compensation) at beginning of session during attempt of shoulder ABD with pain. Mod soft tissue restriction present/addressed in L upper trap. Improved soft tissue mobility and abolished pain with L shoulder ABD AROM after DTM while putting on jacket. Reviewed previously assigned scapular strengthening: María Clifofrd required max verbal + tactile cueing for appropriate form/technique, poor carryover between sessions. Mod weakness of L mid-trap and rhomboids during rows vs R side. María's response to tx was: Patient tolerated therapeutic interventions well and without any adverse events. Reduced pain post treatment. María's Progress towards goals: Improved reaching ability. Improved ability to complete household activities. Reduced intensity of pain. María Clifford continues to have decreased strength, decreased ROM, and pain limiting participation in household chores and recreational activities. Further skilled therapy services are warranted to address the remaining impairments in order to progress towards functional goals. María left session with all questions answered.     Plan:  progress L shoulder ROM/strength as ramon, patient interested in addressing urge incontinence once shoulder improves        Time Calculation  Start Time: 1436  Stop Time: 1516  Time Calculation (min): 40 min     PT Therapeutic Procedures Time Entry  Manual Therapy Time Entry: 17  Therapeutic Exercise Time Entry: 23

## 2025-05-08 RX ORDER — LEVOTHYROXINE SODIUM 75 UG/1
75 TABLET ORAL DAILY
Qty: 90 TABLET | Refills: 0 | Status: SHIPPED | OUTPATIENT
Start: 2025-05-08

## 2025-05-14 ENCOUNTER — TREATMENT (OUTPATIENT)
Dept: PHYSICAL THERAPY | Facility: CLINIC | Age: 80
End: 2025-05-14
Payer: MEDICARE

## 2025-05-14 DIAGNOSIS — M25.512 CHRONIC LEFT SHOULDER PAIN: ICD-10-CM

## 2025-05-14 DIAGNOSIS — G89.29 CHRONIC LEFT SHOULDER PAIN: ICD-10-CM

## 2025-05-14 PROCEDURE — 97140 MANUAL THERAPY 1/> REGIONS: CPT | Mod: GP

## 2025-05-14 PROCEDURE — 97110 THERAPEUTIC EXERCISES: CPT | Mod: GP

## 2025-05-14 NOTE — PROGRESS NOTES
"Physical Therapy    Physical Therapy    Physical Therapy Treatment    Patient Name: María Clifford  MRN: [id number]  Today's Date: 5/14/2025  Time Calculation  Start Time: 1434  Stop Time: 1515  Time Calculation (min): 41 min    Insurance - reviewed   Visit number: 7 (updated 05/14/25)   Payor: HUMANA MEDICARE / Plan: HUMANA GOLD CHOICE / Product Type: *No Product type* /    10 v auth 4/2-6/27 COHERE PAYS AT 90% AFTER .00 337.12 APPLIED OOP 1450.00 344.35 APPLIED   Referring Provider: Rosendo Winn DO      Current Problem  Problem List Items Addressed This Visit    None  Visit Diagnoses         Codes      Chronic left shoulder pain     M25.512, G89.29              Precautions   Fall Risk: None   Denies: Cancer, Pacemaker, Diabetes, Hypertension, latex allergy, epilepsy/seizures, other known cardiac problems, other known neurologic problem, other known pulmonary problems, unexpected weight gain or loss, saddle anesthesia, night sweats, night pain, diplopia, and drop attacks  Past Surgical history: hardware in L foot  Past Medical history: hypothyroidism, CVA, OA in feet/hands, osteopenia *no joint mobs*    General  Subjective    María Clifford denies any falls or complications  Massage really helped last session  Performed yard work for multiple hours on Monday without issue  Still experiencing \"a little pain every once in a while\"  Requesting discharge this visit      María Clifford reports good compliance with HEP    Pain:  0.5/10 currently  Pain location: L shoulder      Objective     Treatments:  Abbreviation Key  NP = not performed this visit   NV = next visit  // = parallel    Assigned HEP- Medbridge [id number]    Manual Therapy:  18 minutes   STM/DTM over L mid-upper trap    Therapeutic Exercise:  23 minutes    UBE level 2 for UE warm up and subjective: x2 min forward and x2 min backwards  Standing L shoulder ER using red theraband, 2x15  Standing L shoulder IR using red theraband, 3x10  Standing " shoulder ext using red theraband, 3x10  Standing mid row using red theraband, 3x10      Outpatient Education: Reviewed home exercise program. Provided manual cues for correct performance of exercises. Provided verbal feedback to improve exercise technique.   María Clifford verbalizes understanding of all education provided: Yes    Assessment:  María Clifford completed treatment which focused on manual interventions and scapular strengthening to address suspected degenerative L RTC tear. Partial review of HEP completed due to patient requesting discharge this visit. Reduced/mod cueing required for appropriate form/technique (especially slow, controlled movements) during exercises with good follow through. Remains challenged with current strength program. Educated patient on importance of continuing HEP after therapy to maintain benefits, patient verbalized understanding. Mod soft tissue restriction present/addressed in L upper trap. Improved soft tissue mobility and abolished pain at end of session. María's response to tx was: Patient tolerated therapeutic interventions well and without any adverse events. Reduced pain post treatment. María's Progress towards goals: Improved reaching ability. Improved ability to complete household activities. Reduced intensity of pain.  María left session with all questions answered.     Plan:  Discharge from PT services      Time Calculation  Start Time: 1434  Stop Time: 1515  Time Calculation (min): 41 min     PT Therapeutic Procedures Time Entry  Manual Therapy Time Entry: 18  Therapeutic Exercise Time Entry: 23

## 2025-05-21 ENCOUNTER — APPOINTMENT (OUTPATIENT)
Dept: PHYSICAL THERAPY | Facility: CLINIC | Age: 80
End: 2025-05-21
Payer: MEDICARE

## 2025-05-22 ENCOUNTER — APPOINTMENT (OUTPATIENT)
Dept: PRIMARY CARE | Facility: CLINIC | Age: 80
End: 2025-05-22
Payer: MEDICARE

## 2025-05-22 VITALS
HEIGHT: 59 IN | HEART RATE: 68 BPM | WEIGHT: 140 LBS | DIASTOLIC BLOOD PRESSURE: 79 MMHG | BODY MASS INDEX: 28.22 KG/M2 | SYSTOLIC BLOOD PRESSURE: 121 MMHG | OXYGEN SATURATION: 93 %

## 2025-05-22 DIAGNOSIS — E08.620: ICD-10-CM

## 2025-05-22 DIAGNOSIS — Z12.11 COLON CANCER SCREENING: ICD-10-CM

## 2025-05-22 DIAGNOSIS — M85.80 OSTEOPENIA, UNSPECIFIED LOCATION: ICD-10-CM

## 2025-05-22 DIAGNOSIS — N95.8 OTHER SPECIFIED MENOPAUSAL AND PERIMENOPAUSAL DISORDERS: ICD-10-CM

## 2025-05-22 DIAGNOSIS — Z00.00 ROUTINE GENERAL MEDICAL EXAMINATION AT HEALTH CARE FACILITY: ICD-10-CM

## 2025-05-22 DIAGNOSIS — R53.83 OTHER FATIGUE: ICD-10-CM

## 2025-05-22 DIAGNOSIS — E03.9 HYPOTHYROIDISM, UNSPECIFIED TYPE: ICD-10-CM

## 2025-05-22 DIAGNOSIS — K76.0 NON-ALCOHOLIC FATTY LIVER DISEASE: ICD-10-CM

## 2025-05-22 DIAGNOSIS — J06.9 UPPER RESPIRATORY TRACT INFECTION, UNSPECIFIED TYPE: ICD-10-CM

## 2025-05-22 DIAGNOSIS — R07.9 CHEST PAIN, UNSPECIFIED TYPE: Primary | ICD-10-CM

## 2025-05-22 RX ORDER — AZITHROMYCIN 250 MG/1
TABLET, FILM COATED ORAL
Qty: 6 TABLET | Refills: 0 | Status: SHIPPED | OUTPATIENT
Start: 2025-05-22 | End: 2025-05-27

## 2025-05-22 RX ORDER — BENZONATATE 100 MG/1
100 CAPSULE ORAL 3 TIMES DAILY PRN
Qty: 42 CAPSULE | Refills: 0 | Status: SHIPPED | OUTPATIENT
Start: 2025-05-22 | End: 2025-06-21

## 2025-05-22 ASSESSMENT — ENCOUNTER SYMPTOMS
PSYCHIATRIC NEGATIVE: 1
WEAKNESS: 1
CARDIOVASCULAR NEGATIVE: 1
FATIGUE: 1
GASTROINTESTINAL NEGATIVE: 1
SHORTNESS OF BREATH: 1
MUSCULOSKELETAL NEGATIVE: 1
COUGH: 1

## 2025-05-22 ASSESSMENT — COLUMBIA-SUICIDE SEVERITY RATING SCALE - C-SSRS
2. HAVE YOU ACTUALLY HAD ANY THOUGHTS OF KILLING YOURSELF?: NO
1. IN THE PAST MONTH, HAVE YOU WISHED YOU WERE DEAD OR WISHED YOU COULD GO TO SLEEP AND NOT WAKE UP?: NO
6. HAVE YOU EVER DONE ANYTHING, STARTED TO DO ANYTHING, OR PREPARED TO DO ANYTHING TO END YOUR LIFE?: NO

## 2025-05-22 ASSESSMENT — ACTIVITIES OF DAILY LIVING (ADL)
TAKING_MEDICATION: INDEPENDENT
MANAGING_FINANCES: INDEPENDENT
BATHING: INDEPENDENT
DRESSING: INDEPENDENT
DOING_HOUSEWORK: INDEPENDENT
GROCERY_SHOPPING: INDEPENDENT

## 2025-05-22 NOTE — PROGRESS NOTES
"MEDICARE WELLNESS  Subjective   Patient ID: María Clifford is a 79 y.o. female.    Patient seen in routine follow-up and medicare wellness visit. States that she had the flu 2.5 weeks ago and now has a mild cough with chest pressure that radiates to the back. No radiation into neck or arms. Able to do yard work, walk a block and flight stairs w/o chest pressure. Does feel SOB. Additionally does endorse sinus pressure, nonproductive cough, mild sore throat. Denies fever, chills. Has not taken a flu or COVID test.         Review of Systems   Constitutional:  Positive for fatigue.   HENT: Negative.     Respiratory:  Positive for cough and shortness of breath.    Cardiovascular: Negative.    Gastrointestinal: Negative.    Musculoskeletal: Negative.    Neurological:  Positive for weakness.   Psychiatric/Behavioral: Negative.         Objective Visit Vitals  /79 (BP Location: Right arm, Patient Position: Sitting, BP Cuff Size: Adult)   Pulse 68   Ht 1.499 m (4' 11\")   Wt 63.5 kg (140 lb)   SpO2 93%   BMI 28.28 kg/m²   OB Status Postmenopausal   Smoking Status Never   BSA 1.63 m²      Physical Exam  Constitutional:       General: She is not in acute distress.     Appearance: She is not ill-appearing.   Eyes:      Pupils: Pupils are equal, round, and reactive to light.   Cardiovascular:      Rate and Rhythm: Normal rate and regular rhythm.      Pulses: Normal pulses.      Heart sounds: No murmur heard.  Pulmonary:      Effort: No respiratory distress.      Breath sounds: Rhonchi present. No wheezing.   Abdominal:      General: Abdomen is flat. Bowel sounds are normal. There is no distension.   Musculoskeletal:      Right lower leg: No edema.      Left lower leg: No edema.   Skin:     General: Skin is warm and dry.   Neurological:      Mental Status: She is alert. Mental status is at baseline.      Cranial Nerves: No cranial nerve deficit.      Motor: Weakness present.   Psychiatric:         Mood and Affect: Mood normal. "         Behavior: Behavior normal.         Assessment/Plan   Diagnoses and all orders for this visit:  Chest pain, unspecified type  -     ECG 12 lead (Clinic Performed)  Upper respiratory tract infection, unspecified type  Colon cancer screening    Patient seen on follow-up       #URI  #C/F Pneumonia  ::c/f flu 2.5 weeks ago now w/ overlying pneumonia, lower c/f ACS given atypical for chest pain (no pain w/ exertion) or pancreatitis given nontender examination though does radiate to the back  -Zpack, Tessalon Perles  -ECG w/ known RBBB, no ST elevation or depression, consider stress test given patient w/ atypical chest pain     #Fatigue  -RFP, TSH, A1c    #HTN  -currently well controlled, encouraged patient to continue to follow with cardiology    #HLD  -unable to tolerate statins, encouraged patient to continue to follow with cardiology and continue Zetia/Repatha     #fatty liver  -encouraged patient to continue with diet and lifestyle modifications    #Urge incontinence  -recommended pelvic floor therapy at the previous visit, discussed oxybutynin as well    #Anxiety/Stress  -continue to monitor, feels as if this is improved    #hip pain/muscle pain  -CTM    #hypothyroid  -repeat TSH     #osteopenia  -due for DEXA scan  -vitamin D supplements      #Glaucoma  #Cataracts  -continue to follow with opthalmology      #Health Maintenance  -routine labs: due for labs 09/2025  -routine vaccinations: defers at this time  -colonoscopy: due at this time, will place a referral  -mammogram: due, last performed 06/2024 but cannot see the official read -> per patient would like to stop (last w/ fibrous breast tissue)     RTC 6 months or PRN

## 2025-05-22 NOTE — PROGRESS NOTES
"Subjective   Reason for Visit: María Clifford is an 79 y.o. female here for a Medicare Wellness visit.     Past Medical, Surgical, and Family History reviewed and updated in chart.    Reviewed all medications by prescribing practitioner or clinical pharmacist (such as prescriptions, OTCs, herbal therapies and supplements) and documented in the medical record.    HPI    Patient Care Team:  Rosendo Winn DO as PCP - General (Internal Medicine)  Rosendo Winn DO as PCP - [employer] PCP  Davonte Mays MD as Consulting Physician (Cardiology)  Syl Tracy MD as Referring Physician (Rheumatology)     Review of Systems    Objective   Vitals:  /79 (BP Location: Right arm, Patient Position: Sitting, BP Cuff Size: Adult)   Pulse 68   Ht 1.499 m (4' 11\")   Wt 63.5 kg (140 lb)   SpO2 93%   BMI 28.28 kg/m²       Physical Exam    Assessment & Plan  Chest pain, unspecified type    Orders:    ECG 12 lead (Clinic Performed)    Upper respiratory tract infection, unspecified type    Orders:    azithromycin (Zithromax) 250 mg tablet; Take 2 tablets (500 mg) by mouth once daily for 1 day, THEN 1 tablet (250 mg) once daily for 4 days. Take 2 tabs (500 mg) by mouth today, than 1 daily for 4 days.    benzonatate (Tessalon) 100 mg capsule; Take 1 capsule (100 mg) by mouth 3 times a day as needed for cough. Do not crush or chew.    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   Aspirin use/disuse was discussed following the guidelines below:  low dose ASA ( mg) should be considered:    If prior Heart Attack/Stroke/Peripheral vascular disease:  Generally recommend daily low dose aspirin unless extremely high bleeding risk (e.g., gastrointestinal).    If no prior Heart Attack/Stroke/Peripheral vascular disease:              Age over 70: Do not use Aspirin for prevention    Age less than 70 and 10-year cardiovascular disease risk is >20%: use low dose Aspirin for prevention.                 Depression Screening  5 - 10 minutes were spent screening for depression.        Patient seen in routine follow-up and medicare wellness visit. States that she had the flu 2.5 weeks ago and now has a mild cough with chest pressure that radiates to the back. No radiation into neck or arms. Able to do yard work, walk a block and flight stairs w/o chest pressure. Does feel SOB. Additionally does endorse sinus pressure, nonproductive cough, mild sore throat. Denies fever, chills. Has not taken a flu or COVID test.            Review of Systems   Constitutional:  Positive for fatigue.   HENT: Negative.     Respiratory:  Positive for cough and shortness of breath.    Cardiovascular: Negative.    Gastrointestinal: Negative.    Musculoskeletal: Negative.    Neurological:  Positive for weakness.   Psychiatric/Behavioral: Negative.           Objective  Visit Vitals  /79 (BP Location: Right arm, Patient Position: Sitting, BP Cuff Size: Adult)   Pulse 68   Ht  "1.499 m (4' 11\")   Wt 63.5 kg (140 lb)   SpO2 93%   BMI 28.28 kg/m²   OB Status Postmenopausal   Smoking Status Never   BSA 1.63 m²      Physical Exam  Constitutional:       General: She is not in acute distress.     Appearance: She is not ill-appearing.   Eyes:      Pupils: Pupils are equal, round, and reactive to light.   Cardiovascular:      Rate and Rhythm: Normal rate and regular rhythm.      Pulses: Normal pulses.      Heart sounds: No murmur heard.  Pulmonary:      Effort: No respiratory distress.      Breath sounds: Rhonchi present. No wheezing.   Abdominal:      General: Abdomen is flat. Bowel sounds are normal. There is no distension.   Musculoskeletal:      Right lower leg: No edema.      Left lower leg: No edema.   Skin:     General: Skin is warm and dry.   Neurological:      Mental Status: She is alert. Mental status is at baseline.      Cranial Nerves: No cranial nerve deficit.      Motor: Weakness present.   Psychiatric:         Mood and Affect: Mood normal.         Behavior: Behavior normal.            Assessment/Plan  Diagnoses and all orders for this visit:  Chest pain, unspecified type  -     ECG 12 lead (Clinic Performed)  Upper respiratory tract infection, unspecified type  Colon cancer screening     Patient seen on follow-up        #URI  #C/F Pneumonia  ::c/f flu 2.5 weeks ago now w/ overlying pneumonia, lower c/f ACS given atypical for chest pain (no pain w/ exertion) or pancreatitis given nontender examination though does radiate to the back  -Zpack, Tessalon Perles  -ECG w/ known RBBB, no ST elevation or depression, consider stress test given patient w/ atypical chest pain      #Fatigue  -RFP, TSH, A1c     #HTN  -currently well controlled, encouraged patient to continue to follow with cardiology    #HLD  -unable to tolerate statins, encouraged patient to continue to follow with cardiology and continue Zetia/Repatha     #fatty liver  -encouraged patient to continue with diet and lifestyle " modifications     #Urge incontinence  -recommended pelvic floor therapy at the previous visit, discussed oxybutynin as well     #Anxiety/Stress  -continue to monitor, feels as if this is improved     #hip pain/muscle pain  -CTM    #hypothyroid  -repeat TSH     #osteopenia  -due for DEXA scan  -vitamin D supplements      #Glaucoma  #Cataracts  -continue to follow with opthalmology      #Health Maintenance  -routine labs: due for labs 09/2025  -routine vaccinations: defers at this time  -colonoscopy: due at this time, will place a referral  -mammogram: due, last performed 06/2024 but cannot see the official read -> per patient would like to stop (last w/ fibrous breast tissue)     RTC 6 months or PRN    Patient seen in conjunction with resident physician, is having some underlying fatigue very nonspecific in nature, lab work today, recommend checking TSH, Follow-up with rheumatology.  Having upper respiratory symptoms, Z-Luiz, did discuss screening test, agreeable with colonoscopy likely does not need mammogram screening anymore.  DEXA scan.  Continue on current medications.

## 2025-05-23 LAB
ALBUMIN SERPL-MCNC: 4.5 G/DL (ref 3.6–5.1)
ALP SERPL-CCNC: 73 U/L (ref 37–153)
ALT SERPL-CCNC: 22 U/L (ref 6–29)
ANION GAP SERPL CALCULATED.4IONS-SCNC: 10 MMOL/L (CALC) (ref 7–17)
AST SERPL-CCNC: 25 U/L (ref 10–35)
BILIRUB SERPL-MCNC: 0.5 MG/DL (ref 0.2–1.2)
BUN SERPL-MCNC: 17 MG/DL (ref 7–25)
CALCIUM SERPL-MCNC: 10 MG/DL (ref 8.6–10.4)
CHLORIDE SERPL-SCNC: 105 MMOL/L (ref 98–110)
CO2 SERPL-SCNC: 28 MMOL/L (ref 20–32)
CREAT SERPL-MCNC: 1.01 MG/DL (ref 0.6–1)
EGFRCR SERPLBLD CKD-EPI 2021: 57 ML/MIN/1.73M2
EST. AVERAGE GLUCOSE BLD GHB EST-MCNC: 126 MG/DL
EST. AVERAGE GLUCOSE BLD GHB EST-SCNC: 7 MMOL/L
GLUCOSE SERPL-MCNC: 84 MG/DL (ref 65–99)
HBA1C MFR BLD: 6 %
POTASSIUM SERPL-SCNC: 4.7 MMOL/L (ref 3.5–5.3)
PROT SERPL-MCNC: 6.9 G/DL (ref 6.1–8.1)
SODIUM SERPL-SCNC: 143 MMOL/L (ref 135–146)
TSH SERPL-ACNC: 3.73 MIU/L (ref 0.4–4.5)

## 2025-05-28 ENCOUNTER — APPOINTMENT (OUTPATIENT)
Dept: PHYSICAL THERAPY | Facility: CLINIC | Age: 80
End: 2025-05-28
Payer: MEDICARE

## 2025-06-04 ENCOUNTER — APPOINTMENT (OUTPATIENT)
Dept: PHYSICAL THERAPY | Facility: CLINIC | Age: 80
End: 2025-06-04
Payer: MEDICARE

## 2025-06-23 ENCOUNTER — HOSPITAL ENCOUNTER (OUTPATIENT)
Dept: RADIOLOGY | Facility: CLINIC | Age: 80
Discharge: HOME | End: 2025-06-23
Payer: MEDICARE

## 2025-06-23 DIAGNOSIS — N95.8 OTHER SPECIFIED MENOPAUSAL AND PERIMENOPAUSAL DISORDERS: ICD-10-CM

## 2025-06-23 DIAGNOSIS — M85.80 OSTEOPENIA, UNSPECIFIED LOCATION: ICD-10-CM

## 2025-06-29 ASSESSMENT — ENCOUNTER SYMPTOMS
MUSCULOSKELETAL NEGATIVE: 1
CONSTITUTIONAL NEGATIVE: 1
NEUROLOGICAL NEGATIVE: 1
PSYCHIATRIC NEGATIVE: 1
GASTROINTESTINAL NEGATIVE: 1
CARDIOVASCULAR NEGATIVE: 1
ALLERGIC/IMMUNOLOGIC NEGATIVE: 1
EYES NEGATIVE: 1
RESPIRATORY NEGATIVE: 1
ENDOCRINE NEGATIVE: 1
HEMATOLOGIC/LYMPHATIC NEGATIVE: 1

## 2025-06-30 NOTE — PROGRESS NOTES
Subjective     History of Present Illness:   María Clifford is a 79 y.o. female who presents to GI clinic for colonoscopy consult.  Her last colonoscopy was 10 years ago. She does not have any significant issues with her bowels. She takes magnesium for constipation and dulcolax, and has regular bowel movements with this. She takes Repatha for Hyperlipidemia. She does not have any significant cardiac issues. She has a family history of colon cancer in her late sister (aged 62). She did undergo a FibroScan in 2/17/2025 and was told that she has stage 1 fatty liver.    She had a FibroScan done in 2/2025 which was a technically adequate study.   The Fibrosis score was consistent with Metavir F0-1 and the CAP score was consistent with steatosis grade 0.  She is not significantly overweight.       Review of Systems  Review of Systems   Constitutional: Negative.    HENT: Negative.     Eyes: Negative.    Respiratory: Negative.     Cardiovascular: Negative.    Gastrointestinal: Negative.    Endocrine: Negative.    Genitourinary: Negative.    Musculoskeletal: Negative.    Skin: Negative.    Allergic/Immunologic: Negative.    Neurological: Negative.    Hematological: Negative.    Psychiatric/Behavioral: Negative.         Past Medical History   has a past medical history of Bifascicular block (05/18/2023), CAD (coronary artery disease) (05/18/2023), Chest pain, atypical (05/18/2023), Disease of intestine, unspecified, Disease of upper respiratory tract, unspecified, Hypercholesterolemia (05/18/2023), Hypertension (05/18/2023), Personal history of other diseases of the circulatory system, Personal history of other diseases of the circulatory system, Personal history of other diseases of the digestive system, Personal history of other diseases of the musculoskeletal system and connective tissue, Personal history of other endocrine, nutritional and metabolic disease, Personal history of other endocrine, nutritional and metabolic  disease, Personal history of other specified conditions, Personal history of pneumonia (recurrent), Personal history of transient ischemic attack (TIA), and cerebral infarction without residual deficits, and Tachycardia (05/18/2023).     Social History   reports that she has never smoked. She has never used smokeless tobacco. She reports that she does not drink alcohol and does not use drugs.     Family History  family history is not on file.     Allergies  RX Allergies[1]    Medications  Current Outpatient Medications   Medication Instructions    aspirin 81 mg, Daily    atenolol (TENORMIN) 25 mg, oral, Daily    ezetimibe (ZETIA) 10 mg, oral, Daily    magnesium oxide (MAG-OX) 400 mg, Daily    multivitamin tablet 1 tablet, Daily    omega 3-dha-epa-fish oil 967 mg-385 mg- 515 mg-1,290 mg capsule,delayed release(DR/EC) Take by mouth.    oxyBUTYnin XL (DITROPAN XL) 5 mg, oral, Daily, Do not crush, chew, or split.    Repatha SureClick 140 mg, subcutaneous, Every 14 days    Synthroid 75 mcg, oral, Daily       Objective   There were no vitals taken for this visit.  Physical Exam  Constitutional:       Appearance: Normal appearance.   HENT:      Head: Normocephalic and atraumatic.      Right Ear: Tympanic membrane normal.      Left Ear: Tympanic membrane normal.      Nose: Nose normal.      Mouth/Throat:      Mouth: Mucous membranes are moist.   Eyes:      Extraocular Movements: Extraocular movements intact.      Pupils: Pupils are equal, round, and reactive to light.   Cardiovascular:      Rate and Rhythm: Normal rate and regular rhythm.      Pulses: Normal pulses.   Pulmonary:      Effort: Pulmonary effort is normal.      Breath sounds: Normal breath sounds.   Abdominal:      General: Abdomen is flat. Bowel sounds are normal. There is no distension.      Palpations: Abdomen is soft.      Tenderness: There is no abdominal tenderness.   Musculoskeletal:         General: Normal range of motion.      Cervical back: Normal  range of motion.   Skin:     General: Skin is warm.   Neurological:      General: No focal deficit present.      Mental Status: She is alert and oriented to person, place, and time.   Psychiatric:         Mood and Affect: Mood normal.       Assessment/Plan   María Clifford is a 79 y.o. female who presents to GI clinic for establishing care and a colonoscopy consult.    #Fatty liver  She recently had a FibroScan done in 2/2025 which showed stage 1 hepatic steatosis. I discussed with the patient that mild fatty liver at this stage carries a low risk for progression, but ongoing monitoring is still important.       #Family history of colon cancer  The patient has a family history of colon cancer in a first degree relative which is her sister at age 63  [facility] t colonoscopy 10 years ago    Will schedule a colonoscopy today  The patient is in agreement. I have provided a prescription of Suprep to be taken prior to the procedure.    She will get this done at [facility].         By signing my name below, ICarolyn Scribe attest that this documentation has been prepared under the direction and in the presence of Sabino Rivera MD            [1]   Allergies  Allergen Reactions    Amoxicillin-Pot Clavulanate Swelling     lip swelling    Penicillins Hives, Swelling and Rash     lip swelling    Amoxicillin Hives    Bee Pollen Other    House Dust Runny nose

## 2025-07-01 ENCOUNTER — APPOINTMENT (OUTPATIENT)
Dept: GASTROENTEROLOGY | Facility: CLINIC | Age: 80
End: 2025-07-01
Payer: MEDICARE

## 2025-07-01 VITALS
SYSTOLIC BLOOD PRESSURE: 129 MMHG | HEART RATE: 69 BPM | WEIGHT: 140.2 LBS | BODY MASS INDEX: 27.52 KG/M2 | DIASTOLIC BLOOD PRESSURE: 73 MMHG | HEIGHT: 60 IN

## 2025-07-01 DIAGNOSIS — Z12.11 COLON CANCER SCREENING: Primary | ICD-10-CM

## 2025-07-01 DIAGNOSIS — K76.0 NON-ALCOHOLIC FATTY LIVER DISEASE: ICD-10-CM

## 2025-07-01 DIAGNOSIS — K59.09 OTHER CONSTIPATION: ICD-10-CM

## 2025-07-01 PROCEDURE — 99204 OFFICE O/P NEW MOD 45 MIN: CPT | Performed by: INTERNAL MEDICINE

## 2025-07-01 PROCEDURE — 3078F DIAST BP <80 MM HG: CPT | Performed by: INTERNAL MEDICINE

## 2025-07-01 PROCEDURE — 1160F RVW MEDS BY RX/DR IN RCRD: CPT | Performed by: INTERNAL MEDICINE

## 2025-07-01 PROCEDURE — 3074F SYST BP LT 130 MM HG: CPT | Performed by: INTERNAL MEDICINE

## 2025-07-01 PROCEDURE — 1036F TOBACCO NON-USER: CPT | Performed by: INTERNAL MEDICINE

## 2025-07-01 PROCEDURE — 1159F MED LIST DOCD IN RCRD: CPT | Performed by: INTERNAL MEDICINE

## 2025-07-01 RX ORDER — SODIUM, POTASSIUM,MAG SULFATES 17.5-3.13G
1 SOLUTION, RECONSTITUTED, ORAL ORAL 2 TIMES DAILY
Qty: 2 EACH | Refills: 0 | Status: SHIPPED | OUTPATIENT
Start: 2025-07-01

## 2025-07-29 DIAGNOSIS — M35.01 SJOGREN'S SYNDROME WITH KERATOCONJUNCTIVITIS SICCA: Primary | ICD-10-CM

## 2025-07-29 DIAGNOSIS — R09.81 SINUS CONGESTION: ICD-10-CM

## 2025-07-29 DIAGNOSIS — R05.3 CHRONIC COUGH: Primary | ICD-10-CM

## 2025-07-29 RX ORDER — ALBUTEROL SULFATE 90 UG/1
2 INHALANT RESPIRATORY (INHALATION) EVERY 6 HOURS PRN
Qty: 18 G | Refills: 1 | Status: SHIPPED | OUTPATIENT
Start: 2025-07-29 | End: 2026-07-29

## 2025-07-29 RX ORDER — FLUTICASONE PROPIONATE 50 MCG
1 SPRAY, SUSPENSION (ML) NASAL DAILY
Qty: 16 G | Refills: 5 | Status: SHIPPED | OUTPATIENT
Start: 2025-07-29 | End: 2026-07-29

## 2025-07-30 ENCOUNTER — ANESTHESIA EVENT (OUTPATIENT)
Dept: GASTROENTEROLOGY | Facility: EXTERNAL LOCATION | Age: 80
End: 2025-07-30

## 2025-08-01 DIAGNOSIS — E03.9 HYPOTHYROIDISM, UNSPECIFIED TYPE: ICD-10-CM

## 2025-08-01 RX ORDER — LEVOTHYROXINE SODIUM 75 UG/1
75 TABLET ORAL DAILY
Qty: 90 TABLET | Refills: 0 | Status: SHIPPED | OUTPATIENT
Start: 2025-08-01

## 2025-08-13 ENCOUNTER — ANESTHESIA (OUTPATIENT)
Dept: GASTROENTEROLOGY | Facility: EXTERNAL LOCATION | Age: 80
End: 2025-08-13

## 2025-08-13 ENCOUNTER — APPOINTMENT (OUTPATIENT)
Dept: GASTROENTEROLOGY | Facility: EXTERNAL LOCATION | Age: 80
End: 2025-08-13
Payer: MEDICARE

## 2025-08-13 VITALS
OXYGEN SATURATION: 95 % | SYSTOLIC BLOOD PRESSURE: 92 MMHG | HEIGHT: 59 IN | RESPIRATION RATE: 21 BRPM | WEIGHT: 131 LBS | HEART RATE: 69 BPM | DIASTOLIC BLOOD PRESSURE: 53 MMHG | TEMPERATURE: 97.3 F | BODY MASS INDEX: 26.41 KG/M2

## 2025-08-13 DIAGNOSIS — Z12.11 COLON CANCER SCREENING: ICD-10-CM

## 2025-08-13 RX ORDER — SODIUM CHLORIDE 9 MG/ML
INJECTION, SOLUTION INTRAVENOUS CONTINUOUS PRN
Status: DISCONTINUED | OUTPATIENT
Start: 2025-08-13 | End: 2025-08-13

## 2025-08-13 RX ORDER — LIDOCAINE HYDROCHLORIDE 20 MG/ML
INJECTION, SOLUTION INFILTRATION; PERINEURAL AS NEEDED
Status: DISCONTINUED | OUTPATIENT
Start: 2025-08-13 | End: 2025-08-13

## 2025-08-13 RX ORDER — PROPOFOL 10 MG/ML
INJECTION, EMULSION INTRAVENOUS AS NEEDED
Status: DISCONTINUED | OUTPATIENT
Start: 2025-08-13 | End: 2025-08-13

## 2025-08-13 RX ADMIN — PROPOFOL 20 MG: 10 INJECTION, EMULSION INTRAVENOUS at 11:45

## 2025-08-13 RX ADMIN — LIDOCAINE HYDROCHLORIDE 50 ML: 20 INJECTION, SOLUTION INFILTRATION; PERINEURAL at 11:40

## 2025-08-13 RX ADMIN — SODIUM CHLORIDE: 9 INJECTION, SOLUTION INTRAVENOUS at 11:36

## 2025-08-13 RX ADMIN — PROPOFOL 50 MG: 10 INJECTION, EMULSION INTRAVENOUS at 11:52

## 2025-08-13 RX ADMIN — PROPOFOL 80 MG: 10 INJECTION, EMULSION INTRAVENOUS at 11:40

## 2025-08-13 RX ADMIN — PROPOFOL 50 MG: 10 INJECTION, EMULSION INTRAVENOUS at 11:49

## 2025-08-13 SDOH — HEALTH STABILITY: MENTAL HEALTH: CURRENT SMOKER: 0

## 2025-08-13 ASSESSMENT — PAIN SCALES - GENERAL
PAINLEVEL_OUTOF10: 0 - NO PAIN
PAIN_LEVEL: 0

## 2025-08-13 ASSESSMENT — PAIN - FUNCTIONAL ASSESSMENT
PAIN_FUNCTIONAL_ASSESSMENT: 0-10

## 2025-08-18 ENCOUNTER — HOSPITAL ENCOUNTER (OUTPATIENT)
Dept: RADIOLOGY | Facility: HOSPITAL | Age: 80
Discharge: HOME | End: 2025-08-18
Payer: MEDICARE

## 2025-08-18 DIAGNOSIS — M35.01 SJOGREN'S SYNDROME WITH KERATOCONJUNCTIVITIS SICCA: ICD-10-CM

## 2025-08-18 PROCEDURE — 71250 CT THORAX DX C-: CPT

## 2025-08-18 PROCEDURE — 71250 CT THORAX DX C-: CPT | Performed by: STUDENT IN AN ORGANIZED HEALTH CARE EDUCATION/TRAINING PROGRAM

## 2025-08-21 LAB
LABORATORY COMMENT REPORT: NORMAL
PATH REPORT.FINAL DX SPEC: NORMAL
PATH REPORT.GROSS SPEC: NORMAL
PATH REPORT.TOTAL CANCER: NORMAL

## 2025-08-22 ENCOUNTER — SPECIALTY PHARMACY (OUTPATIENT)
Dept: PHARMACY | Facility: CLINIC | Age: 80
End: 2025-08-22

## 2025-08-22 PROCEDURE — RXMED WILLOW AMBULATORY MEDICATION CHARGE

## 2025-08-25 ENCOUNTER — HOSPITAL ENCOUNTER (OUTPATIENT)
Dept: RADIOLOGY | Facility: CLINIC | Age: 80
Discharge: HOME | End: 2025-08-25
Payer: MEDICARE

## 2025-08-25 ENCOUNTER — TELEPHONE (OUTPATIENT)
Dept: PRIMARY CARE | Facility: CLINIC | Age: 80
End: 2025-08-25
Payer: MEDICARE

## 2025-08-25 PROCEDURE — 77080 DXA BONE DENSITY AXIAL: CPT | Performed by: RADIOLOGY

## 2025-08-25 PROCEDURE — 77080 DXA BONE DENSITY AXIAL: CPT

## 2025-09-04 ENCOUNTER — PHARMACY VISIT (OUTPATIENT)
Dept: PHARMACY | Facility: CLINIC | Age: 80
End: 2025-09-04
Payer: COMMERCIAL

## 2025-09-19 ENCOUNTER — APPOINTMENT (OUTPATIENT)
Dept: PRIMARY CARE | Facility: CLINIC | Age: 80
End: 2025-09-19
Payer: MEDICARE

## 2025-10-07 ENCOUNTER — APPOINTMENT (OUTPATIENT)
Dept: RHEUMATOLOGY | Facility: CLINIC | Age: 80
End: 2025-10-07
Payer: MEDICARE

## 2025-11-11 ENCOUNTER — APPOINTMENT (OUTPATIENT)
Facility: CLINIC | Age: 80
End: 2025-11-11
Payer: MEDICARE

## 2026-01-20 ENCOUNTER — APPOINTMENT (OUTPATIENT)
Dept: RHEUMATOLOGY | Facility: CLINIC | Age: 81
End: 2026-01-20
Payer: MEDICARE